# Patient Record
Sex: FEMALE | Race: WHITE | Employment: OTHER | URBAN - METROPOLITAN AREA
[De-identification: names, ages, dates, MRNs, and addresses within clinical notes are randomized per-mention and may not be internally consistent; named-entity substitution may affect disease eponyms.]

---

## 2017-12-13 ENCOUNTER — HOSPITAL ENCOUNTER (OUTPATIENT)
Dept: MAMMOGRAPHY | Age: 72
Discharge: HOME | End: 2017-12-13
Attending: FAMILY MEDICINE
Payer: MEDICARE

## 2017-12-13 DIAGNOSIS — Z12.31 VISIT FOR SCREENING MAMMOGRAM: ICD-10-CM

## 2017-12-13 PROCEDURE — 77063 BREAST TOMOSYNTHESIS BI: CPT

## 2018-12-17 ENCOUNTER — HOSPITAL ENCOUNTER (OUTPATIENT)
Dept: MAMMOGRAPHY | Age: 73
Discharge: HOME | End: 2018-12-17
Attending: FAMILY MEDICINE
Payer: MEDICARE

## 2018-12-17 DIAGNOSIS — Z12.31 VISIT FOR SCREENING MAMMOGRAM: ICD-10-CM

## 2018-12-17 PROCEDURE — 77063 BREAST TOMOSYNTHESIS BI: CPT

## 2019-10-31 ENCOUNTER — HOSPITAL ENCOUNTER (OUTPATIENT)
Dept: PREADMISSION TESTING | Age: 74
Discharge: HOME | End: 2019-10-31
Payer: MEDICARE

## 2019-10-31 LAB
ALBUMIN SERPL-MCNC: 4.1 G/DL (ref 3.4–5)
ALBUMIN/GLOB SERPL: 1.3 {RATIO} (ref 0.8–1.7)
ALP SERPL-CCNC: 84 U/L (ref 45–117)
ALT SERPL-CCNC: 23 U/L (ref 13–56)
ANION GAP SERPL CALC-SCNC: 6 MMOL/L (ref 3–18)
AST SERPL-CCNC: 18 U/L (ref 10–38)
ATRIAL RATE: 117 BPM
BILIRUB SERPL-MCNC: 0.6 MG/DL (ref 0.2–1)
BUN SERPL-MCNC: 15 MG/DL (ref 7–18)
BUN/CREAT SERPL: 13 (ref 12–20)
CALCIUM SERPL-MCNC: 10.4 MG/DL (ref 8.5–10.1)
CALCULATED R AXIS, ECG10: -38 DEGREES
CALCULATED T AXIS, ECG11: 27 DEGREES
CHLORIDE SERPL-SCNC: 107 MMOL/L (ref 100–111)
CO2 SERPL-SCNC: 28 MMOL/L (ref 21–32)
CREAT SERPL-MCNC: 1.12 MG/DL (ref 0.6–1.3)
DIAGNOSIS, 93000: NORMAL
ERYTHROCYTE [DISTWIDTH] IN BLOOD BY AUTOMATED COUNT: 13.1 % (ref 11.6–14.5)
GLOBULIN SER CALC-MCNC: 3.1 G/DL (ref 2–4)
GLUCOSE SERPL-MCNC: 94 MG/DL (ref 74–99)
HCT VFR BLD AUTO: 44.7 % (ref 35–45)
HGB BLD-MCNC: 14.7 G/DL (ref 12–16)
MCH RBC QN AUTO: 30.5 PG (ref 24–34)
MCHC RBC AUTO-ENTMCNC: 32.9 G/DL (ref 31–37)
MCV RBC AUTO: 92.7 FL (ref 74–97)
PLATELET # BLD AUTO: 264 K/UL (ref 135–420)
PMV BLD AUTO: 8.8 FL (ref 9.2–11.8)
POTASSIUM SERPL-SCNC: 4.3 MMOL/L (ref 3.5–5.5)
PROT SERPL-MCNC: 7.2 G/DL (ref 6.4–8.2)
Q-T INTERVAL, ECG07: 402 MS
QRS DURATION, ECG06: 90 MS
QTC CALCULATION (BEZET), ECG08: 458 MS
RBC # BLD AUTO: 4.82 M/UL (ref 4.2–5.3)
SODIUM SERPL-SCNC: 141 MMOL/L (ref 136–145)
VENTRICULAR RATE, ECG03: 78 BPM
WBC # BLD AUTO: 7.4 K/UL (ref 4.6–13.2)

## 2019-10-31 PROCEDURE — 36415 COLL VENOUS BLD VENIPUNCTURE: CPT

## 2019-10-31 PROCEDURE — 93005 ELECTROCARDIOGRAM TRACING: CPT

## 2019-10-31 PROCEDURE — 85027 COMPLETE CBC AUTOMATED: CPT

## 2019-10-31 PROCEDURE — 80053 COMPREHEN METABOLIC PANEL: CPT

## 2019-11-20 ENCOUNTER — HOSPITAL ENCOUNTER (OUTPATIENT)
Dept: LAB | Age: 74
Discharge: HOME | End: 2019-11-20
Payer: MEDICARE

## 2019-11-20 LAB
ANION GAP SERPL CALC-SCNC: 6 MMOL/L (ref 3–18)
BUN SERPL-MCNC: 18 MG/DL (ref 7–18)
BUN/CREAT SERPL: 18 (ref 12–20)
CALCIUM SERPL-MCNC: 10 MG/DL (ref 8.5–10.1)
CHLORIDE SERPL-SCNC: 109 MMOL/L (ref 100–111)
CO2 SERPL-SCNC: 27 MMOL/L (ref 21–32)
CREAT SERPL-MCNC: 1.01 MG/DL (ref 0.6–1.3)
GLUCOSE SERPL-MCNC: 97 MG/DL (ref 74–99)
POTASSIUM SERPL-SCNC: 4.2 MMOL/L (ref 3.5–5.5)
SODIUM SERPL-SCNC: 142 MMOL/L (ref 136–145)

## 2019-11-20 PROCEDURE — 80048 BASIC METABOLIC PNL TOTAL CA: CPT

## 2019-11-20 PROCEDURE — 36415 COLL VENOUS BLD VENIPUNCTURE: CPT

## 2019-12-02 ENCOUNTER — ANESTHESIA EVENT (OUTPATIENT)
Dept: SURGERY | Age: 74
End: 2019-12-02
Payer: MEDICARE

## 2019-12-02 ENCOUNTER — ANESTHESIA (OUTPATIENT)
Dept: SURGERY | Age: 74
End: 2019-12-02
Payer: MEDICARE

## 2019-12-02 ENCOUNTER — HOSPITAL ENCOUNTER (OUTPATIENT)
Age: 74
Setting detail: OUTPATIENT SURGERY
Discharge: HOME | End: 2019-12-02
Attending: ORTHOPAEDIC SURGERY | Admitting: ORTHOPAEDIC SURGERY
Payer: MEDICARE

## 2019-12-02 VITALS
WEIGHT: 207 LBS | HEART RATE: 63 BPM | OXYGEN SATURATION: 98 % | SYSTOLIC BLOOD PRESSURE: 140 MMHG | DIASTOLIC BLOOD PRESSURE: 74 MMHG | RESPIRATION RATE: 16 BRPM | TEMPERATURE: 97.2 F | BODY MASS INDEX: 33.27 KG/M2 | HEIGHT: 66 IN

## 2019-12-02 DIAGNOSIS — M65.331 TRIGGER MIDDLE FINGER OF RIGHT HAND: Primary | Chronic | ICD-10-CM

## 2019-12-02 PROCEDURE — 74011000250 HC RX REV CODE- 250: Performed by: ORTHOPAEDIC SURGERY

## 2019-12-02 PROCEDURE — 74011250636 HC RX REV CODE- 250/636: Performed by: NURSE ANESTHETIST, CERTIFIED REGISTERED

## 2019-12-02 PROCEDURE — 74011250636 HC RX REV CODE- 250/636: Performed by: ORTHOPAEDIC SURGERY

## 2019-12-02 PROCEDURE — 76010000154 HC OR TIME FIRST 0.5 HR: Performed by: ORTHOPAEDIC SURGERY

## 2019-12-02 PROCEDURE — 77030002916 HC SUT ETHLN J&J -A: Performed by: ORTHOPAEDIC SURGERY

## 2019-12-02 PROCEDURE — 77030040361 HC SLV COMPR DVT MDII -B: Performed by: ORTHOPAEDIC SURGERY

## 2019-12-02 PROCEDURE — 77030020782 HC GWN BAIR PAWS FLX 3M -B: Performed by: ORTHOPAEDIC SURGERY

## 2019-12-02 PROCEDURE — 77030018836 HC SOL IRR NACL ICUM -A: Performed by: ORTHOPAEDIC SURGERY

## 2019-12-02 PROCEDURE — 76210000021 HC REC RM PH II 0.5 TO 1 HR: Performed by: ORTHOPAEDIC SURGERY

## 2019-12-02 PROCEDURE — 76060000031 HC ANESTHESIA FIRST 0.5 HR: Performed by: ORTHOPAEDIC SURGERY

## 2019-12-02 PROCEDURE — 77030013189 HC SLV COMPR SCD HUNT -B: Performed by: ORTHOPAEDIC SURGERY

## 2019-12-02 RX ORDER — ONDANSETRON 2 MG/ML
4 INJECTION INTRAMUSCULAR; INTRAVENOUS ONCE
Status: CANCELLED | OUTPATIENT
Start: 2019-12-02 | End: 2019-12-02

## 2019-12-02 RX ORDER — FENTANYL CITRATE 50 UG/ML
INJECTION, SOLUTION INTRAMUSCULAR; INTRAVENOUS AS NEEDED
Status: DISCONTINUED | OUTPATIENT
Start: 2019-12-02 | End: 2019-12-02 | Stop reason: HOSPADM

## 2019-12-02 RX ORDER — CEFAZOLIN SODIUM/WATER 2 G/20 ML
2 SYRINGE (ML) INTRAVENOUS ONCE
Status: DISCONTINUED | OUTPATIENT
Start: 2019-12-02 | End: 2019-12-02 | Stop reason: HOSPADM

## 2019-12-02 RX ORDER — PROPOFOL 10 MG/ML
INJECTION, EMULSION INTRAVENOUS AS NEEDED
Status: DISCONTINUED | OUTPATIENT
Start: 2019-12-02 | End: 2019-12-02 | Stop reason: HOSPADM

## 2019-12-02 RX ORDER — NALOXONE HYDROCHLORIDE 0.4 MG/ML
0.4 INJECTION, SOLUTION INTRAMUSCULAR; INTRAVENOUS; SUBCUTANEOUS AS NEEDED
Status: CANCELLED | OUTPATIENT
Start: 2019-12-02

## 2019-12-02 RX ORDER — FENTANYL CITRATE 50 UG/ML
50 INJECTION, SOLUTION INTRAMUSCULAR; INTRAVENOUS
Status: CANCELLED | OUTPATIENT
Start: 2019-12-02

## 2019-12-02 RX ORDER — SODIUM CHLORIDE, SODIUM LACTATE, POTASSIUM CHLORIDE, CALCIUM CHLORIDE 600; 310; 30; 20 MG/100ML; MG/100ML; MG/100ML; MG/100ML
100 INJECTION, SOLUTION INTRAVENOUS CONTINUOUS
Status: CANCELLED | OUTPATIENT
Start: 2019-12-02

## 2019-12-02 RX ORDER — HYDROCODONE BITARTRATE AND ACETAMINOPHEN 5; 325 MG/1; MG/1
1 TABLET ORAL
Qty: 20 TAB | Refills: 0 | Status: SHIPPED | OUTPATIENT
Start: 2019-12-02 | End: 2019-12-05

## 2019-12-02 RX ORDER — OXYCODONE AND ACETAMINOPHEN 5; 325 MG/1; MG/1
1 TABLET ORAL
Status: CANCELLED | OUTPATIENT
Start: 2019-12-02 | End: 2019-12-03

## 2019-12-02 RX ORDER — MIDAZOLAM HYDROCHLORIDE 1 MG/ML
INJECTION, SOLUTION INTRAMUSCULAR; INTRAVENOUS AS NEEDED
Status: DISCONTINUED | OUTPATIENT
Start: 2019-12-02 | End: 2019-12-02 | Stop reason: HOSPADM

## 2019-12-02 RX ORDER — FLUMAZENIL 0.1 MG/ML
0.2 INJECTION INTRAVENOUS
Status: CANCELLED | OUTPATIENT
Start: 2019-12-02

## 2019-12-02 RX ORDER — SODIUM CHLORIDE, SODIUM LACTATE, POTASSIUM CHLORIDE, CALCIUM CHLORIDE 600; 310; 30; 20 MG/100ML; MG/100ML; MG/100ML; MG/100ML
125 INJECTION, SOLUTION INTRAVENOUS CONTINUOUS
Status: DISCONTINUED | OUTPATIENT
Start: 2019-12-02 | End: 2019-12-02 | Stop reason: HOSPADM

## 2019-12-02 RX ORDER — BUPIVACAINE HYDROCHLORIDE 5 MG/ML
INJECTION, SOLUTION EPIDURAL; INTRACAUDAL AS NEEDED
Status: DISCONTINUED | OUTPATIENT
Start: 2019-12-02 | End: 2019-12-02 | Stop reason: HOSPADM

## 2019-12-02 RX ADMIN — MIDAZOLAM 1 MG: 1 INJECTION INTRAMUSCULAR; INTRAVENOUS at 11:34

## 2019-12-02 RX ADMIN — FENTANYL CITRATE 25 MCG: 50 INJECTION, SOLUTION INTRAMUSCULAR; INTRAVENOUS at 11:35

## 2019-12-02 RX ADMIN — SODIUM CHLORIDE, SODIUM LACTATE, POTASSIUM CHLORIDE, AND CALCIUM CHLORIDE 125 ML/HR: 600; 310; 30; 20 INJECTION, SOLUTION INTRAVENOUS at 09:02

## 2019-12-02 RX ADMIN — PROPOFOL 20 MG: 10 INJECTION, EMULSION INTRAVENOUS at 11:36

## 2019-12-02 RX ADMIN — FENTANYL CITRATE 25 MCG: 50 INJECTION, SOLUTION INTRAMUSCULAR; INTRAVENOUS at 11:38

## 2019-12-02 RX ADMIN — MIDAZOLAM 1 MG: 1 INJECTION INTRAMUSCULAR; INTRAVENOUS at 11:27

## 2019-12-02 NOTE — PERIOP NOTES
Reviewed PTA medication list with patient/caregiver and patient/caregiver denies any additional medications. Patient admits to having a responsible adult care for them for at least 24 hours after surgery.     Dual skin assessment completed by Wily MONTAÑO and Magdalena Chery RN.

## 2019-12-02 NOTE — ANESTHESIA POSTPROCEDURE EVALUATION
Post-Anesthesia Evaluation and Assessment    Cardiovascular Function/Vital Signs  Visit Vitals  /74   Pulse 67   Temp 36.4 °C (97.5 °F)   Resp 16   Ht 5' 6.25\" (1.683 m)   Wt 93.9 kg (207 lb)   SpO2 97%   BMI 33.16 kg/m²       Patient is status post Procedure(s):  RIGHT MIDDLE FINGER TRIGGER RELEASE, \"SPEC POP\". Nausea/Vomiting: Controlled. Postoperative hydration reviewed and adequate. Pain:  Pain Scale 1: Numeric (0 - 10) (12/02/19 1222)  Pain Intensity 1: 0 (12/02/19 1222)   Managed. Neurological Status:   Neuro (WDL): Within Defined Limits (12/02/19 1222)   At baseline. Mental Status and Level of Consciousness: Arousable. Pulmonary Status:   O2 Device: Room air (12/02/19 1202)   Adequate oxygenation and airway patent. Complications related to anesthesia: None    Post-anesthesia assessment completed. No concerns. Patient has met all discharge requirements.     Signed By: Danielle Briceño CRNA    December 2, 2019

## 2019-12-02 NOTE — DISCHARGE INSTRUCTIONS
Ice and elevation   Resume pre hospital diet  Drink plenty of fluids  Take prescription as directed  Ambulate in house  Avoid heavy lifting pushing/pulling with operative hand  Follow up with Dr. Kiana Perez in 1 week          DISCHARGE SUMMARY from Nurse    PATIENT INSTRUCTIONS:    After general anesthesia or intravenous sedation, for 24 hours or while taking prescription Narcotics:  · Limit your activities  · Do not drive and operate hazardous machinery  · Do not make important personal or business decisions  · Do  not drink alcoholic beverages  · If you have not urinated within 8 hours after discharge, please contact your surgeon on call. Report the following to your surgeon:  · Excessive pain, swelling, redness or odor of or around the surgical area  · Temperature over 100.5  · Nausea and vomiting lasting longer than 4 hours or if unable to take medications  · Any signs of decreased circulation or nerve impairment to extremity: change in color, persistent  numbness, tingling, coldness or increase pain  · Any questions    What to do at Home:  Recommended activity: Ambulate in house, No driving while on analgesics and No heavy lifting until advised     If you experience any of the following symptoms fever, chills, uncontrollable pain , active bleeding, drainage or redness, circulation changes, please follow up with Dr. Kiana Perez. *  Please give a list of your current medications to your Primary Care Provider. *  Please update this list whenever your medications are discontinued, doses are      changed, or new medications (including over-the-counter products) are added. *  Please carry medication information at all times in case of emergency situations. These are general instructions for a healthy lifestyle:    No smoking/ No tobacco products/ Avoid exposure to second hand smoke  Surgeon General's Warning:  Quitting smoking now greatly reduces serious risk to your health.     Obesity, smoking, and sedentary lifestyle greatly increases your risk for illness    A healthy diet, regular physical exercise & weight monitoring are important for maintaining a healthy lifestyle    You may be retaining fluid if you have a history of heart failure or if you experience any of the following symptoms:  Weight gain of 3 pounds or more overnight or 5 pounds in a week, increased swelling in our hands or feet or shortness of breath while lying flat in bed. Please call your doctor as soon as you notice any of these symptoms; do not wait until your next office visit. Patient armband removed and shredded    The discharge information has been reviewed with the patient and caregiver. The patient and caregiver verbalized understanding. Discharge medications reviewed with the patient and caregiver and appropriate educational materials and side effects teaching were provided.   ___________________________________________________________________________________________________________________________________

## 2019-12-02 NOTE — ANESTHESIA PREPROCEDURE EVALUATION
Relevant Problems   No relevant active problems       Anesthetic History     PONV   Pertinent negatives: No increased risk of difficult airway, pseudocholinesterase deficiency, malignant hyperthermia and history of awareness of surgery under anesthesia       Review of Systems / Medical History  Patient summary reviewed, nursing notes reviewed and pertinent labs reviewed    Pulmonary  Within defined limits                 Neuro/Psych   Within defined limits           Cardiovascular    Hypertension: well controlled        Dysrhythmias : atrial fibrillation    Pertinent negatives: No past MI, CAD, PAD, angina and CHF  Exercise tolerance: >4 METS     GI/Hepatic/Renal     GERD: well controlled        Pertinent negatives: No hepatitis, liver disease and renal disease   Endo/Other      Hypothyroidism: well controlled  Obesity and arthritis  Pertinent negatives: No diabetes, hyperthyroidism and blood dyscrasia   Other Findings              Physical Exam    Airway  Mallampati: III  TM Distance: 4 - 6 cm  Neck ROM: decreased range of motion   Mouth opening: Normal     Cardiovascular    Rhythm: irregular  Rate: normal         Dental  No notable dental hx       Pulmonary  Breath sounds clear to auscultation               Abdominal  GI exam deferred       Other Findings            Anesthetic Plan    ASA: 3  Anesthesia type: MAC          Induction: Intravenous  Anesthetic plan and risks discussed with: Patient and Spouse

## 2019-12-02 NOTE — PERIOP NOTES
I have reviewed the provider's instructions with the patient, answering all questions to her satisfaction. Reviewed D/C instructions, medications, and AVS. Pt to f/u in 1 week. Pt has appt scheduled. . No duplicate meds. Noted.

## 2019-12-02 NOTE — H&P
History and Physical        Patient: Merissa Love               Sex: female          DOA: 12/2/2019         YOB: 1945      Age:  75 y.o.        LOS:  LOS: 0 days        HPI:     Merissa Love is a 75 y.o. female who has a right middle trigger finger hasn't responded to non-op therapy    Past Medical History:   Diagnosis Date    Atrial fibrillation ([facility] 2017    Cancer ([facility] 03/2009    endometrial    GERD (gastroesophageal reflux disease)     Hx of colonic polyps     Hypertension before 2004    Nausea & vomiting     OA (osteoarthritis)     spine, hands,    Radiation therapy complication     lower body, endomentrial ca    Thyroid disease     hypo    V tach ([facility] [facility] 2006       Past Surgical History:   Procedure Laterality Date    HX CHOLECYSTECTOMY      HX COLONOSCOPY  11-24-15    Dr. Teresa Evans ARTHROSCOPY Right 7/2013    HX ELINA AND BSO  04/2009    lymph nodes       History reviewed. No pertinent family history. Social History     Socioeconomic History    Marital status: SINGLE     Spouse name: Not on file    Number of children: Not on file    Years of education: Not on file    Highest education level: Not on file   Tobacco Use    Smoking status: Never Smoker    Smokeless tobacco: Never Used   Substance and Sexual Activity    Alcohol use: Yes     Alcohol/week: 1.0 standard drinks     Types: 1 Glasses of wine per week    Drug use: No       Prior to Admission medications    Medication Sig Start Date End Date Taking? Authorizing Provider   apixaban (ELIQUIS) 5 mg tablet Take 5 mg by mouth two (2) times a day. Indications: Afib   Yes Provider, Historical   levothyroxine (SYNTHROID) 75 mcg tablet Take 75 mcg by mouth Daily (before breakfast). Indications: a condition with low thyroid hormone levels   Yes Provider, Historical   pantoprazole (PROTONIX) 40 mg tablet Take 40 mg by mouth nightly.  Indications: gastroesophageal reflux disease   Yes Provider, Historical   cyanocobalamin (VITAMIN B12) 100 mcg tablet Take 100 mcg by mouth daily. Yes Provider, Historical   bisoprolol-hydrochlorothiazide (ZIAC) 10-6.25 mg per tablet Take 2 Tabs by mouth daily. Indications: high blood pressure   Yes Provider, Historical   losartan (COZAAR) 50 mg tablet Take 50 mg by mouth nightly. Indications: high blood pressure   Yes Provider, Historical   simvastatin (ZOCOR) 20 mg tablet Take 20 mg by mouth nightly. Indications: high cholesterol   Yes Provider, Historical   calcium citrate-vitamin d3 (CITRACAL + D) 315-200 mg-unit tab Take 1 tablet by mouth two (2) times daily (with meals). Yes Provider, Historical       No Known Allergies    Review of Systems  A comprehensive review of systems was negative except for that written in the History of Present Illness. Physical Exam:      Visit Vitals  /75   Pulse 73   Temp 97.5 °F (36.4 °C)   Resp 16   Ht 5' 6.25\" (1.683 m)   Wt 93.9 kg (207 lb)   SpO2 100%   BMI 33.16 kg/m²       Physical Exam:  Physical Exam:   General:  Alert, cooperative, no distress, appears stated age. Eyes:  Conjunctivae/corneas clear. PERRL, EOMs intact. Fundi benign   Ears:  Normal TMs and external ear canals both ears. Nose: Nares normal. Septum midline. Mucosa normal. No drainage or sinus tenderness. Mouth/Throat: Lips, mucosa, and tongue normal. Teeth and gums normal.   Neck: Supple, symmetrical, trachea midline, no adenopathy, thyroid: no enlargement/tenderness/nodules, no carotid bruit and no JVD. Back:   Symmetric, no curvature. ROM normal. No CVA tenderness. Lungs:   Clear to auscultation bilaterally. Heart:  Regular rate and rhythm, S1, S2 normal, no murmur, click, rub or gallop. Abdomen:   Soft, non-tender. Bowel sounds normal. No masses,  No organomegaly. Extremities: Extremities normal, atraumatic, no cyanosis or edema. Right MF tender A1 pulley   Pulses: 2+ and symmetric all extremities.    Skin: Skin color, texture, turgor normal. No rashes or lesions   Lymph nodes: Cervical, supraclavicular, and axillary nodes normal.   Neurologic: CNII-XII intact. Normal strength, sensation and reflexes throughout. Labs Reviewed: All lab results for the last 24 hours reviewed.     Assessment/Plan     Principal Problem:    Trigger middle finger of right hand (12/2/2019)        Right MF trigger release

## 2019-12-03 NOTE — OP NOTES
[address] REPORT    Name:  Reynaldo Mathew  MR#:   [id number]  :  1945  ACCOUNT #:  [de-identified]  DATE OF SERVICE:  2019    PREOPERATIVE DIAGNOSIS:  Right middle finger trigger finger. POSTOPERATIVE DIAGNOSIS:  Right middle finger trigger finger. PROCEDURE PERFORMED:  Right middle finger trigger finger release. SURGEON:  Pranay Rahman MD    ASSISTANT:  There were no assistants. ANESTHESIOLOGIST:  Dr. Alma Rosa Phillips. ANESTHESIA:  General.    COMPLICATIONS:  No complications. SPECIMENS REMOVED:  No specimen. IMPLANTS:  No implants. ESTIMATED BLOOD LOSS:  No blood loss. INDICATIONS:  A 48-year-old female with symptomatic right middle finger trigger finger. PROCEDURE:  The patient was brought to the operating room after receiving antibiotics. In the OR, the right forearm, wrist, and hand were prepped and draped sterilely. Under light sedation, the area over the middle finger A1 pulley was infiltrated with lidocaine and then a small 1.5-cm incision was made in line with the skin crease. Blunt dissection was carried down to the tendon sheath. The A1 pulley was released under direct visualization. The finger was put through range of motion to ensure normal gliding. At this point, the skin and subcu were closed with interrupted nylon sutures.       Virginie Butler MD      [id number]  D:  2019 11:57  T:  2019 20:41  JOB #:  [id number]

## 2019-12-19 ENCOUNTER — HOSPITAL ENCOUNTER (OUTPATIENT)
Dept: MAMMOGRAPHY | Age: 74
Discharge: HOME | End: 2019-12-19
Payer: MEDICARE

## 2019-12-19 DIAGNOSIS — Z12.31 SCREENING MAMMOGRAM FOR HIGH-RISK PATIENT: ICD-10-CM

## 2019-12-19 PROCEDURE — 77063 BREAST TOMOSYNTHESIS BI: CPT

## 2020-01-21 ENCOUNTER — HOSPITAL ENCOUNTER (OUTPATIENT)
Dept: PHYSICAL THERAPY | Age: 75
Discharge: HOME | End: 2020-01-21
Payer: MEDICARE

## 2020-01-21 PROCEDURE — 97018 PARAFFIN BATH THERAPY: CPT

## 2020-01-21 PROCEDURE — 97110 THERAPEUTIC EXERCISES: CPT

## 2020-01-21 PROCEDURE — 97165 OT EVAL LOW COMPLEX 30 MIN: CPT

## 2020-01-21 NOTE — PROGRESS NOTES
OT DAILY TREATMENT NOTE 10-18    Patient Name: Rene Mortensen  CEFS:  : 1945  [x]  Patient  Verified  Payor: [place] MEDICARE / Plan: [place] MEDICARE PART A & B / Product Type: Medicare /    In time:205  Out time:245  Total Treatment Time (min): 40  Visit #: 1 of 18    Medicare/BCBS Only   Total Timed Codes (min):  23 1:1 Treatment Time:  40     Treatment Area: Hand pain, right [M79.161]    SUBJECTIVE  Pain Level (0-10 scale): 3/10  Any medication changes, allergies to medications, adverse drug reactions, diagnosis change, or new procedure performed?: [x] No    [] Yes (see summary sheet for update)  Subjective functional status/changes:   [] No changes reported  It hurts ot hold steering wheel    OBJECTIVE    Modality rationale: decrease pain and increase tissue extensibility to improve the patients ability to grasp   Min Type Additional Details    [] Estim:  []Unatt       []IFC  []Premod                        []Other:  []w/ice   []w/heat  Position:  Location:    [] Estim: []Att    []TENS instruct  []NMES                    []Other:  []w/US   []w/ice   []w/heat  Position:  Location:    []  Traction: [] Cervical       []Lumbar                       [] Prone          []Supine                       []Intermittent   []Continuous Lbs:  [] before manual  [] after manual    []  Ultrasound: []Continuous   [] Pulsed                           []1MHz   []3MHz W/cm2:  Location:    []  Iontophoresis with dexamethasone         Location: [] Take home patch   [] In clinic    []  Ice     []  heat  []  Ice massage  []  Laser   []  Anodyne Position:  Location:    []  Laser with stim  []  Other: Paraffin 10 mins Position:  Location:right hand    []  Vasopneumatic Device Pressure:       [] lo [] med [] hi   Temperature: [] lo [] med [] hi       [x] Skin assessment post-treatment:  [x]intact []redness- no adverse reaction    []redness  adverse reaction:     17 min [x]Eval                  []Re-Eval       8 min Therapeutic Exercise:  [] See flow sheet :   Rationale: increase ROM to improve the patients ability to grasp  PROm digit flexion extension reviewed  Reviewed diagnosis and causes       5 min Manual Therapy:  Instrument assisted   Rationale: decrease pain and increase ROM to grasp  Instrument assisted soft tissue mobilization to palm right hand    With   [] TE   [] TA   [] neuro   [] other: Patient Education: [x] Review HEP    [] Progressed/Changed HEP based on:   [] positioning   [] body mechanics   [] transfers   [] heat/ice application   [] Splint wear/care   [] Sensory re-education   [] scar management      [] other:            Other Objective/Functional Measures:     Subjective: pt is a right hand dominant, 74 y.o.y/o, female who sustained had 10 year history of triggering * and had surgery on 12/2/20. Prior level of function: I self care, cooking, home care, reading, knitting, driving,   Pain XHEFR:(8-BU pain 10-debilitating pain) mild    Description/Location: right finger with c/o no relief   Worst pain9/10 Least pain 3/10   Activities which aggravate pain: grasp   Activities which ease pain: nothing  Current functional limitations/living situation: With other,     Medical hx: CAncer history, atrial fib, HTN, thyroid, arthritis    Medications: See the written copy of this report in the patient's paper medical record.        Objective:  Edema: Circumference    Right  Left   PIP   6.5cm  5.9cm  Scar/Wound: size, color, drainage, adhesions, location: palm right hand    Sensation:No changes noted    Hand ROM R/L   Index Middle Ring Small Thumb    MP  0-75/80    CMC   PIP  20-90/110    MP   DIP  0-60/80    IP         Johnson Abd         Radial Abd     Hand Strength:   Gross Grasp 3pt Pinch Lateral Pinch Tip Pinch   Right  28 11     Left 52 10       Nine-Hole Peg Test:  Left= __22___seconds  Right=__27___seconds  Finger Opposition:WNL  Palpation: Tender A1 pulley    Pain Level (0-10 scale) post treatment: 11/2    ASSESSMENT/Changes in Function:      [x]  See Plan of Care  []  See progress note/recertification  []  See Discharge Summary             PLAN  []  Upgrade activities as tolerated     []  Continue plan of care  []  Update interventions per flow sheet       []  Discharge due to:_  []  Other:_      Rhiannon Valdivia OTR/L 1/21/2020  1:56 PM    Future Appointments   Date Time Provider Sheela Huddleston   1/21/2020  2:00 PM Anabella Mendoza OTR/L [id number]

## 2020-01-21 NOTE — PROGRESS NOTES
[facility]  [facility]  [address] [place] [address]  [phone] fax    Plan of Care/Statement of Necessity for Occupational Therapy Services    Patient name: Kwabena Hernandes Start of Care: 2020   Referral source: Hellen Staley MD : 1945    Medical Diagnosis: Hand pain, right [M79.641]  Payor: [place] MEDICARE / Plan: VA MEDICARE PART A & B / Product Type: Medicare /  Onset Date:2019    Treatment Diagnosis: Pain right hand   Prior Hospitalization: see medical history Provider#: [id number]   Medications: Verified on Patient summary List    Comorbidities: Right trigger finger release, atrial fib, thyroid, arthritis, endometrial cancer   Prior Level of Function: I self care home care driving, retired from resort, likes reading, knitting          The Plan of Care and following information is based on the information from the initial evaluation. Assessment/ key information: Jill Allison is a right hand dominant, 75 y.o.y/o, female whohad 10 year history of triggering previously resolved with injections * and had surgery on 20. She reports pain of 3/10 today. She is unable to fully flex or extend right middle digit as follows:  MP 0-75 ( left 80), PIP 20-90 ( left 110), DIP 0-60 ( left 80). Edema is present with girth of 6.5 vs 5.3 on left middle digit PIP.  is 28# ( left 52). Her fine motor skills are slowed at 27 seconds for 9 hole peg test.  Her hand in tender in palm and proximal phalanx. Her FOTO is 59/100 reflecting slight impairment in function. She will benefit from skilled occupational therapy to resolve pain, improve ROM and function.       Evaluation Complexity: History LOW Complexity : Brief history review  Examination LOW Complexity : 1-3 performance deficits relating to physical, cognitive , or psychosocial skils that result in activity limitations and / or participation restrictions  Clinical Decision Making LOW Complexity : No comorbidities that affect functional and no verbal or physical assistance needed to complete eval tasks   Overall Complexity Rating: LOW     Problem List: Pain effecting function, Decreased range of motion, Decreased strength, Edema effecting function, Decreased coordination/prehension, Decreased ADL/functional abilities  and Other     Treatment Plan may include any combination of the following: Therapeutic exercise, Therapeutic activities, Physical agent/modality, Scar management, Manual therapy, Patient education and ADLs/IADLs    Patient / Family readiness to learn indicated by: asking questions, trying to perform skills and interest    Persons(s) to be included in education:   patient (P)    Barriers to Learning/Limitations: None    Patient Goal (s): Finger to bend and straighten, be able to grasp    Patient Self Reported Health Status: good    Rehabilitation Potential: excellent    Short Term Goals: To be accomplished in 2 weeks:  1. Patient will be independent in home exercise program for digit ROM. 2.  Patient to be independent in edema management strategies. 3.  Patient will be able to touch palm with middle digit. Long Term Goals: To be accomplished in 4 weeks:   1. Patient will increase middle digit MEEK by at least   30 degrees for better use for home care tasks. 2.  Patient will be able to  with at least 40# to assit with opening jars. 3.  Patient will report pain 0-2/10 with use for home care tasks  4. Patient will report improved ability to perform carrying and grasping with right hand as shown by increase in FOTO of at least 10 points.       Frequency / Duration: Patient to be seen 3 times per week for 4 weeks:    Patient/ Caregiver education and instruction: Diagnosis, prognosis, self care, activity modification and exercises  [x]  Plan of care has been reviewed with GARCIA    Certification Period: 1/21/20-2/19/20    TYREL Mcmahon 1/21/2020 3:44 PM      ________________________________________________________________________    I certify that the above Therapy Services are being furnished while the patient is under my care. I agree with the treatment plan and certify that this therapy is necessary.     Physician's Signature:____________Date:_________TIME:________    ** Signature, Date and Time must be completed for valid certification **    Please sign and return to [facility] [facility]   [address] [place] [address]  [phone] fax

## 2020-01-24 ENCOUNTER — HOSPITAL ENCOUNTER (OUTPATIENT)
Dept: PHYSICAL THERAPY | Age: 75
Discharge: HOME | End: 2020-01-24
Payer: MEDICARE

## 2020-01-24 PROCEDURE — 97110 THERAPEUTIC EXERCISES: CPT

## 2020-01-24 PROCEDURE — 97140 MANUAL THERAPY 1/> REGIONS: CPT

## 2020-01-24 PROCEDURE — 97018 PARAFFIN BATH THERAPY: CPT

## 2020-01-24 NOTE — PROGRESS NOTES
OT DAILY TREATMENT NOTE 10-18    Patient Name: Joe aMllory  AUYO:[id number]  : 1945  [x]  Patient  Verified  Payor: [place] MEDICARE / Plan: [place] MEDICARE PART A & B / Product Type: Medicare /    In time:1030  Out time:1115  Total Treatment Time (min): 45  Visit #: 2 of 18    Medicare/BCBS Only   Total Timed Codes (min):  35 1:1 Treatment Time:  45     Treatment Area: Hand pain, right [M79.641]    SUBJECTIVE  Pain Level (0-10 scale): 2/10  Any medication changes, allergies to medications, adverse drug reactions, diagnosis change, or new procedure performed?: [x] No    [] Yes (see summary sheet for update)  Subjective functional status/changes:   [] No changes reported  Feeling better  Sleeve helping    OBJECTIVE    Modality rationale: decrease inflammation, decrease pain and increase tissue extensibility to improve the patients ability to grasp   Min Type Additional Details    [] Estim:  []Unatt       []IFC  []Premod                        []Other:  []w/ice   []w/heat  Position:  Location:    [] Estim: []Att    []TENS instruct  []NMES                    []Other:  []w/US   []w/ice   []w/heat  Position:  Location:    []  Traction: [] Cervical       []Lumbar                       [] Prone          []Supine                       []Intermittent   []Continuous Lbs:  [] before manual  [] after manual   8 [x]  Ultrasound: []Continuous   [x] Pulsed              50%             []1MHz   [x]3MHz W/cm2:1.0  Location:palm    []  Iontophoresis with dexamethasone         Location: [] Take home patch   [] In clinic    []  Ice     []  heat  []  Ice massage  []  Laser   []  Anodyne Position:  Location:    []  Laser with stim  []  Other:  Position:  Location:    []  Vasopneumatic Device Pressure:       [] lo [] med [] hi   Temperature: [] lo [] med [] hi       [x] Skin assessment post-treatment:  [x]intact []redness- no adverse reaction      19 min Therapeutic Exercise:  [] See flow sheet :   Rationale: increase ROM to improve the patients ability to improve digit ROM  Sot putty and pegs flattening right hand x 10  Hook fist right hand x 10      8 min Manual Therapy:  IASTM   Rationale: increase ROM to improve grasp  IASTM to palm to reduce edema and adhesions and improve digit ROM      With   [] TE   [] TA   [] neuro   [] other: Patient Education: [x] Review HEP    [] Progressed/Changed HEP based on: edema massage  [] positioning   [] body mechanics   [] transfers   [] heat/ice application   [] Splint wear/care   [] Sensory re-education   [] scar management      [] other:            Other Objective/Functional Measures:   Reduced redness in palm today  Digit becoming straighter at PIP     Pain Level (0-10 scale) post treatment: 2/10    ASSESSMENT/Changes in Function: Improving ROM, pain    Patient will continue to benefit from skilled OT services to modify and progress therapeutic interventions, address ROM deficits, address strength deficits, analyze and address soft tissue restrictions and instruct in home and community integration to attain remaining goals. []  See Plan of Care  []  See progress note/recertification  []  See Discharge Summary         Progress towards goals / Updated goals:  1. Patient will be independent in home exercise program for digit ROM. 2.  Patient to be independent in edema management strategies. Status at last visit: has edema sleeve 1/21/20, introduced edema massage 1/24/20  3. Patient will be able to touch palm with middle digit.     Long Term Goals: To be accomplished in 4 weeks:              1.  Patient will increase middle digit MEEK by at least   30 degrees for better use for home care tasks. Status last visit: Improving extension of PIP 1/24/20  2. Patient will be able to  with at least 40# to assit with opening jars. 3.  Patient will report pain 0-2/10 with use for home care tasks  Status last visit; Met for today 1/24/20, look for consitency  4.   Patient will report improved ability to perform carrying and grasping with right hand as shown by increase in FOTO of at least 10 points*    PLAN  []  Upgrade activities as tolerated     [x]  Continue plan of care  []  Update interventions per flow sheet       []  Discharge due to:_  []  Other:_      TYREL Chaidez 1/24/2020  10:39 AM    Future Appointments   Date Time Provider Sheela Huddleston   1/27/2020  4:45 PM Boykinhelga Perez LYFNVPP [facility]   1/29/2020  2:30 PM Ashutosh Perez VADROQX [facility]   2/3/2020  3:15 PM Boykinhelga Perez VAKPNHF [facility]   2/5/2020 11:00 AM Ashutosh Perez ZMKROEE [facility]   2/10/2020  1:45 PM Boykinhelga Perez GOGYVCW [facility]   2/12/2020 10:15 AM Boykinhelga Perze EPNXEVI [facility]   2/14/2020 10:30 AM Boykin Ana SRJKQGL [facility]

## 2020-01-27 ENCOUNTER — HOSPITAL ENCOUNTER (OUTPATIENT)
Dept: PHYSICAL THERAPY | Age: 75
Discharge: HOME | End: 2020-01-27
Payer: MEDICARE

## 2020-01-27 PROCEDURE — 97035 APP MDLTY 1+ULTRASOUND EA 15: CPT

## 2020-01-27 PROCEDURE — 97110 THERAPEUTIC EXERCISES: CPT

## 2020-01-27 PROCEDURE — 97018 PARAFFIN BATH THERAPY: CPT

## 2020-01-27 NOTE — PROGRESS NOTES
OT DAILY TREATMENT NOTE 10-18    Patient Name: Kwabena Hernandes  MXCK:  : 1945  [x]  Patient  Verified  Payor: [place] MEDICARE / Plan: [place] MEDICARE PART A & B / Product Type: Medicare /    In time:138  Out time:221  Total Treatment Time (min): 43  Visit #: 3 of 8    Medicare/BCBS Only   Total Timed Codes (min):  33 1:1 Treatment Time:  43     Treatment Area: Hand pain, right [M79.641]    SUBJECTIVE  Pain Level (0-10 scale): 1-2/10  Any medication changes, allergies to medications, adverse drug reactions, diagnosis change, or new procedure performed?: [x] No    [] Yes (see summary sheet for update)  Subjective functional status/changes:   [] No changes reported  It is very hard to find paraffin wax. OBJECTIVE            Modality rationale: decrease inflammation, decrease pain and increase tissue extensibility to improve the patients ability to grasp   Min Type Additional Details      []? Estim:  []? Unatt       []? IFC  []? Premod                        []?Other:  []?w/ice   []?w/heat  Position:  Location:      []? Estim: []? Att    []? TENS instruct  []? NMES                    []?Other:  []?w/US   []?w/ice   []?w/heat  Position:  Location:      []? Traction: []? Cervical       []? Lumbar                       []? Prone          []? Supine                       []?Intermittent   []? Continuous Lbs:  []? before manual  []? after manual    8 [x]? Ultrasound: []? Continuous   [x]? Pulsed              50%             []? 1MHz   [x]? 3MHz W/cm2:1.0  Location:palm      []? Iontophoresis with dexamethasone         Location: []? Take home patch   []? In clinic      []? Ice     []?  heat  []? Ice massage  []? Laser   []? Anodyne Position:  Location:    10  []? Laser with stim  [x]? Other: Paraffin Position:  Location: Right Hand      []? Vasopneumatic Device Pressure:       []? lo []? med []? hi   Temperature: []? lo []? med []? hi       [x]? Skin assessment post-treatment:  [x]? intact []? redness- no adverse reaction    17 min Therapeutic Exercise:  [] See flow sheet :   Rationale: increase ROM and increase strength to improve the patients ability to improve digit ROM    Hook fist 10x  MF Extension  MF Abduction/Adduction   Soft Putty and pegs- Flattening Right hand 10/10     8 min Manual Therapy:  IASTM   Rationale: increase ROM and increase tissue extensibility to improve grasp    IASTM to palm to reduce edema and adhesion to improve digit ROM      With   [] TE   [] TA   [] neuro   [] other: Patient Education: [x] Review HEP    [] Progressed/Changed HEP based on:   [] positioning   [] body mechanics   [] transfers   [] heat/ice application   [] Splint wear/care   [] Sensory re-education   [] scar management      [] other:            Other Objective/Functional Measures:   Tightness with Abduction/Adduction reported        Pain Level (0-10 scale) post treatment: 1-2/10    ASSESSMENT/Changes in Function: Digit ROM improving     Patient will continue to benefit from skilled OT services to modify and progress therapeutic interventions, address ROM deficits, address strength deficits and instruct in home and community integration to attain remaining goals. []  See Plan of Care  []  See progress note/recertification  []  See Discharge Summary         Progress towards goals / Updated goals:  1.  Patient will be independent in home exercise program for digit ROM. Progressing 1/27/20  2.  Patient to be independent in edema management strategies. Status at last visit: has edema sleeve 1/21/20, introduced edema massage 1/24/20  3.  Patient will be able to touch palm with middle digit.     Long Term Goals: To be accomplished in 4 weeks:              1.  Patient will increase middle digit MEEK by at least   30 degrees for better use for home care tasks. Status last visit: Improving extension of PIP 1/24/20  2.  Patient will be able to  with at least 40# to assit with opening jars.   3.  Patient will report pain 0-2/10 with use for home care tasks  Status last visit;  Met for today 1/24/20, look for consitency  4.  Patient will report improved ability to perform carrying and grasping with right hand as shown by increase in FOTO of at least 10 points         PLAN  []  Upgrade activities as tolerated     [x]  Continue plan of care  []  Update interventions per flow sheet       []  Discharge due to:_  []  Other:_      JOSE Rhoades/L 1/27/2020  1:33 PM     Future Appointments   Date Time Provider Sheela Huddleston   1/27/2020  1:45 PM Marissa Spurling [id number] [address]   1/29/2020  2:30 PM Marissa Spurling ZBECROO [address]   2/3/2020  3:15 PM Marissa Spurling QXQKESR [address]   2/5/2020 11:00 AM Marissa Spurling NDIEHRI [address]   2/10/2020  1:45 PM Marissa Spurling SMCKBQN [address]   2/12/2020 10:15 AM Marissa Spurling BOVYRHG [address]   2/14/2020 10:30 AM Marissa Spurling [id number] [address]

## 2020-01-29 ENCOUNTER — APPOINTMENT (OUTPATIENT)
Dept: PHYSICAL THERAPY | Age: 75
End: 2020-01-29
Payer: MEDICARE

## 2020-01-30 ENCOUNTER — HOSPITAL ENCOUNTER (OUTPATIENT)
Dept: PHYSICAL THERAPY | Age: 75
Discharge: HOME | End: 2020-01-30
Payer: MEDICARE

## 2020-01-30 PROCEDURE — 97035 APP MDLTY 1+ULTRASOUND EA 15: CPT

## 2020-01-30 PROCEDURE — 97018 PARAFFIN BATH THERAPY: CPT

## 2020-01-30 PROCEDURE — 97110 THERAPEUTIC EXERCISES: CPT

## 2020-01-30 NOTE — PROGRESS NOTES
OT DAILY TREATMENT NOTE 10-18    Patient Name: Ghada Torres  AZUX:  : 1945  [x]  Patient  Verified  Payor: [place] MEDICARE / Plan: [place] MEDICARE PART A & B / Product Type: Medicare /    In time:412  Out time:455  Total Treatment Time (min): 43  Visit #: 4 of 8    Medicare/BCBS Only   Total Timed Codes (min):  33 1:1 Treatment Time:  43     Treatment Area: Hand pain, right [M99.301]    SUBJECTIVE  Pain Level (0-10 scale): 1.5/10  Any medication changes, allergies to medications, adverse drug reactions, diagnosis change, or new procedure performed?: [x] No    [] Yes (see summary sheet for update)  Subjective functional status/changes:   [] No changes reported  I woke up this morning and it was almost straight. OBJECTIVE            Modality rationale: decrease inflammation, decrease pain and increase tissue extensibility to improve the patients ability to grasp   Min Type Additional Details       []? ? Estim:  []?? Unatt       []? ?IFC  []? ?Premod                        []? ? Other:  []??w/ice   []? ?w/heat  Position:  Location:       []? ? Estim: []??Att    []? ?TENS instruct  []? ?NMES                    []? ? Other:  []??w/US   []? ?w/ice   []? ?w/heat  Position:  Location:       []? ?  Traction: []?? Cervical       []? ?Lumbar                       []? ? Prone          []? ?Supine                       []? ?Intermittent   []? ? Continuous Lbs:  []?? before manual  []? ? after manual     8 [x]? ?  Ultrasound: []??Continuous   [x]? ? Pulsed              50%             []? ?1MHz   [x]? ? 3MHz W/cm2:1.0  Location:palm       []? ?  Iontophoresis with dexamethasone         Location: []?? Take home patch   []? ? In clinic       []? ?  Ice     []? ?  heat  []? ?  Ice massage  []? ?  Laser   []? ?  Anodyne Position:  Location:     10  []??  Laser with stim  [x]? ?  Other: Paraffin Position:  Location: Right Hand       []? ?  Vasopneumatic Device Pressure:       []? ? lo []? ? med []?? hi   Temperature: []?? lo []? ? med []?? hi      [x]?? Skin assessment post-treatment:  [x]? ? intact []? ?redness- no adverse reaction    25 min Therapeutic Exercise:  [] See flow sheet :   Rationale: increase ROM and increase strength to improve the patients ability to     MP Hyperextension  MF Abduction/Adduction  Soft Putty: 10/10  Hook Fist into soft putty 8x  Towel Scrunch with MF 10x      With   [] TE   [] TA   [] neuro   [] other: Patient Education: [x] Review HEP    [] Progressed/Changed HEP based on:   [] positioning   [] body mechanics   [] transfers   [] heat/ice application   [] Splint wear/care   [] Sensory re-education   [] scar management      [] other:            Other Objective/Functional Measures:     Pain with hook fist on this date      Pain Level (0-10 scale) post treatment: 1/10    ASSESSMENT/Changes in Function: ROM improving    Patient will continue to benefit from skilled OT services to modify and progress therapeutic interventions, address ROM deficits, address strength deficits and instruct in home and community integration to attain remaining goals. []  See Plan of Care  []  See progress note/recertification  []  See Discharge Summary         Progress towards goals / Updated goals:  1.  Patient will be independent in home exercise program for digit ROM. Progressing 1/27/20    2.  Patient to be independent in edema management strategies. Status at last visit: has edema sleeve 1/21/20, introduced edema massage 1/24/20    3.  Patient will be able to touch palm with middle digit. Progressing 1/30/20     Long Term Goals: To be accomplished in 4 weeks:              1.  Patient will increase middle digit MEEK by at least   30 degrees for better use for home care tasks. Status last visit: Improving extension of PIP 1/24/20     2.  Patient will be able to  with at least 40# to assit with opening jars.   Status at last visit: Progressing with in clinic tasks 1/30/20    3.  Patient will report pain 0-2/10 with use for home care tasks  Status last visit;  Met for today 1/24/20, look for consitency    4.  Patient will report improved ability to perform carrying and grasping with right hand as shown by increase in FOTO of at least 10 points       PLAN  []  Upgrade activities as tolerated     [x]  Continue plan of care  []  Update interventions per flow sheet       []  Discharge due to:_  []  Other:_      Hedda Latosha ,GARCIA/L 1/30/2020  1:20 PM    Future Appointments   Date Time Provider Sheela Huddleston   1/30/2020  4:15 PM James Late QFUAFAT [facility]   2/3/2020  3:15 PM [place] Late EMIMWGD [facility]   2/5/2020 11:00 AM James Late EISBIWW [facility]   2/10/2020  1:45 PM [place] Late IFAALLO [facility]   2/12/2020 10:15 AM James Late RFHJVLY [facility]   2/14/2020 10:30 AM [place] Late MMCPTPB [facility]

## 2020-02-03 ENCOUNTER — HOSPITAL ENCOUNTER (OUTPATIENT)
Dept: PHYSICAL THERAPY | Age: 75
Discharge: HOME | End: 2020-02-03
Payer: MEDICARE

## 2020-02-03 PROCEDURE — 97018 PARAFFIN BATH THERAPY: CPT

## 2020-02-03 PROCEDURE — 97110 THERAPEUTIC EXERCISES: CPT

## 2020-02-03 PROCEDURE — 97035 APP MDLTY 1+ULTRASOUND EA 15: CPT

## 2020-02-03 NOTE — PROGRESS NOTES
OT DAILY TREATMENT NOTE 10-18    Patient Name: Gumaro Barnes  HBEA:[id number]  : 1945  [x]  Patient  Verified  Payor: [place] MEDICARE / Plan: [place] MEDICARE PART A & B / Product Type: Medicare /    In time:1055  Out time:1135  Total Treatment Time (min): 40  Visit #: 5 of 8    Medicare/BCBS Only   Total Timed Codes (min):  30 1:1 Treatment Time:  40     Treatment Area: Hand pain, right [M29.611]    SUBJECTIVE  Pain Level (0-10 scale): 2/10  Any medication changes, allergies to medications, adverse drug reactions, diagnosis change, or new procedure performed?: [x] No    [] Yes (see summary sheet for update)  Subjective functional status/changes:   [] No changes reported  It is going to be nice out this afternoon, I'm tempted to do some yard work     OBJECTIVE              Modality rationale: decrease inflammation, decrease pain and increase tissue extensibility to improve the patients ability to grasp   Min Type Additional Details       []??? Estim:  []? ??Unatt       []? ??IFC  []? ? ?Premod                        []? ??Other:  []???w/ice   []? ??w/heat  Position:  Location:       []??? Estim: []? ? ? Att    []? ??TENS instruct  []? ??NMES                    []? ??Other:  []???w/US   []? ??w/ice   []? ??w/heat  Position:  Location:       []? ??  Traction: []??? Cervical       []? ? ?Lumbar                       []? ?? Prone          []? ? ?Supine                       []? ? ? Intermittent   []? ?? Continuous Lbs:  []??? before manual  []? ?? after manual     8 [x]? ??  Ultrasound: []? ? ? Continuous   [x]? ?? Pulsed              50%             []? ??1MHz   [x]? ??3MHz W/cm2:1.0  Location:palm       []? ??  Iontophoresis with dexamethasone         Location: []??? Take home patch   []??? In clinic       []? ??  Ice     []? ??  heat  []? ??  Ice massage  []? ??  Laser   []? ??  Anodyne Position:  Location:     10  []???  Laser with stim  [x]???  Other: Paraffin Position:  Location: Right Hand       []? ??  Vasopneumatic Device Pressure:       []? ?? lo []??? med []? ?? hi   Temperature: []??? lo []? ?? med []? ?? hi        [x]? ?? Skin assessment post-treatment:  [x]? ??intact []? ??redness- no adverse reaction    22 min Therapeutic Exercise:  [] See flow sheet :   Rationale: increase ROM and increase strength to improve the patients ability to     Soft Putty: Manipulated with Right hand to reveal/remove 1/4 in pegs 10/10  MP Hyperextension  MF Abduction/Adduction  Hook Fist into soft putty  Towel Scrunch       With   [] TE   [] TA   [] neuro   [] other: Patient Education: [x] Review HEP    [] Progressed/Changed HEP based on:   [] positioning   [] body mechanics   [] transfers   [] heat/ice application   [] Splint wear/care   [] Sensory re-education   [] scar management      [] other:            Other Objective/Functional Measures:     Some discomfort with hook fist  Re administered new edema sleeve (XL)       Pain Level (0-10 scale) post treatment: 2.5/10    ASSESSMENT/Changes in Function: ROM improving     Patient will continue to benefit from skilled OT services to modify and progress therapeutic interventions, address ROM deficits, address strength deficits and instruct in home and community integration to attain remaining goals. []  See Plan of Care  []  See progress note/recertification   []  See Discharge Summary         Progress towards goals / Updated goals:  1.  Patient will be independent in home exercise program for digit ROM. Met 2/3/20     2.  Patient to be independent in edema management strategies. Status at last visit: has edema sleeve 1/21/20, introduced edema massage 1/24/20     3.  Patient will be able to touch palm with middle digit. Progressing 1/30/20     Long Term Goals: To be accomplished in 4 weeks:              1.  Patient will increase middle digit MEEK by at least   30 degrees for better use for home care tasks.   Status last visit: Improving extension of PIP 1/24/20      2.  Patient will be able to  with at least 40# to assit with opening jars. Status at last visit: Progressing with in clinic tasks 1/30/20     3.  Patient will report pain 0-2/10 with use for home care tasks  Status last visit;  Met for today 1/24/20, look for consitency     4.  Patient will report improved ability to perform carrying and grasping with right hand as shown by increase in FOTO of at least 10 points    PLAN  []  Upgrade activities as tolerated     [x]  Continue plan of care  []  Update interventions per flow sheet       []  Discharge due to:_  []  Other:_      HARJEET Nayak 2/3/2020  10:29 AM    Future Appointments   Date Time Provider Sheela Huddleston   2/5/2020 11:00 AM Mary BARTON [facility]   2/10/2020  1:45 PM Mary GOMEZ [facility]   2/12/2020 10:15 AM Mary SANCHEZVFVBE [facility]   2/14/2020 10:30 AM Mary Rae UMUSRBELENA [facility]

## 2020-02-05 ENCOUNTER — HOSPITAL ENCOUNTER (OUTPATIENT)
Dept: PHYSICAL THERAPY | Age: 75
Discharge: HOME | End: 2020-02-05
Payer: MEDICARE

## 2020-02-05 PROCEDURE — 97035 APP MDLTY 1+ULTRASOUND EA 15: CPT

## 2020-02-05 PROCEDURE — 97018 PARAFFIN BATH THERAPY: CPT

## 2020-02-05 PROCEDURE — 97110 THERAPEUTIC EXERCISES: CPT

## 2020-02-05 NOTE — PROGRESS NOTES
OT DAILY TREATMENT NOTE 10-18    Patient Name: Kaden Angel  [id number]  : 1945  [x]  Patient  Verified  Payor: [place] MEDICARE / Plan: [place] MEDICARE PART A & B / Product Type: Medicare /    In time:1100  Out time:1141  Total Treatment Time (min): 41  Visit #: 6 of 8    Medicare/BCBS Only   Total Timed Codes (min):  31 1:1 Treatment Time:  41     Treatment Area: Hand pain, right [F62.043]    SUBJECTIVE  Pain Level (0-10 scale): 1/10  Any medication changes, allergies to medications, adverse drug reactions, diagnosis change, or new procedure performed?: [x] No    [] Yes (see summary sheet for update)  Subjective functional status/changes:   [] No changes reported  I woke up this morning and it was almost straight. It didn't start to hurt either when I started doing stuff with it. OBJECTIVE                Modality rationale: decrease inflammation, decrease pain and increase tissue extensibility to improve the patients ability to grasp   Min Type Additional Details       []???? Estim:  []????Unatt       []????IFC  []????Premod                        []?? ??Other:  []????w/ice   []? ???w/heat  Position:  Location:       []???? Estim: []?? ?? Att    []????TENS instruct  []????NMES                    []?? ??Other:  []????w/US   []? ???w/ice   []? ???w/heat  Position:  Location:       []????  Traction: []???? Cervical       []????Lumbar                       []???? Prone          []????Supine                       []?? ?? Intermittent   []???? Continuous Lbs:  []???? before manual  []???? after manual     8 [x]? ???  Ultrasound: []???? Continuous   [x]? ??? Pulsed              50%             []????1MHz   [x]????3MHz W/cm2:1.0  Location:palm       []????  Iontophoresis with dexamethasone         Location: []???? Take home patch   []???? In clinic       []????  Ice     []????  heat  []????  Ice massage  []????  Laser   []????  Anodyne Position:  Location:     10  []????  Laser with stim  [x]????  Other: Paraffin Position:  Location: Right Hand       []????  Vasopneumatic Device Pressure:       []???? lo []???? med []???? hi   Temperature: []???? lo []???? med []???? hi        [x]? ??? Skin assessment post-treatment:  [x]? ???intact []? ???redness- no adverse reaction    13 min Therapeutic Exercise:  [] See flow sheet :   Rationale: increase ROM and increase strength to improve the patients ability to , grasp, pinch     MF Hyperextension with hold  MF Abduction/Adduction  Med Theraputty: 10/10    10 min Manual Therapy:  IASTM   Rationale: decrease pain, increase ROM and increase tissue extensibility to improve overall function of Right hand    IASTM to incision site       With   [] TE   [] TA   [] neuro   [] other: Patient Education: [x] Review HEP    [] Progressed/Changed HEP based on:   [] positioning   [] body mechanics   [] transfers   [] heat/ice application   [] Splint wear/care   [] Sensory re-education   [] scar management      [] other:            Other Objective/Functional Measures:     Able to tolerate upgrade in med putty with minimal discomfort     Pain Level (0-10 scale) post treatment: 2/10    ASSESSMENT/Changes in Function: ROM/Strength improving     Patient will continue to benefit from skilled OT services to modify and progress therapeutic interventions, address ROM deficits, address strength deficits and instruct in home and community integration to attain remaining goals. []  See Plan of Care  []  See progress note/recertification  []  See Discharge Summary         Progress towards goals / Updated goals:  1.  Patient will be independent in home exercise program for digit ROM. Met 2/3/20     2.  Patient to be independent in edema management strategies. Status at last visit: has edema sleeve 1/21/20, introduced edema massage 1/24/20     3.  Patient will be able to touch palm with middle digit.  Progressing 1/30/20     Long Term Goals: To be accomplished in 4 weeks:              1.  Patient will increase middle digit MEEK by at least   30 degrees for better use for home care tasks. Status last visit: Improving extension of PIP 1/24/20      2.  Patient will be able to  with at least 40# to assit with opening jars. Status at last visit: Progressing with in clinic tasks 1/30/20     3.  Patient will report pain 0-2/10 with use for home care tasks  Status last visit;  Met for today 1/24/20, look for consitency     4.  Patient will report improved ability to perform carrying and grasping with right hand as shown by increase in FOTO of at least 10 points    PLAN  []  Upgrade activities as tolerated     [x]  Continue plan of care  []  Update interventions per flow sheet       []  Discharge due to:_  []  Other:_      HARJEET Villeda 2/5/2020  11:07 AM    Future Appointments   Date Time Provider Sheela Huddleston   2/10/2020  1:45 PM Kannan Verdin [id number] [facility]   2/12/2020 10:15 AM Kannan Verdin [id number] [facility]   2/14/2020 10:30 AM Kannan Verdin [id number] [facility]

## 2020-02-10 ENCOUNTER — HOSPITAL ENCOUNTER (OUTPATIENT)
Dept: PHYSICAL THERAPY | Age: 75
Discharge: HOME | End: 2020-02-10
Payer: MEDICARE

## 2020-02-10 PROCEDURE — 97110 THERAPEUTIC EXERCISES: CPT

## 2020-02-10 PROCEDURE — 97140 MANUAL THERAPY 1/> REGIONS: CPT

## 2020-02-10 PROCEDURE — 97035 APP MDLTY 1+ULTRASOUND EA 15: CPT

## 2020-02-10 NOTE — PROGRESS NOTES
OT DAILY TREATMENT NOTE 10-18    Patient Name: Olga Puga  RRLK:[id number]  : 1945  [x]  Patient  Verified  Payor: [place] MEDICARE / Plan: [place] MEDICARE PART A & B / Product Type: Medicare /    In time:144  Out time:230  Total Treatment Time (min): 46  Visit #: 7 of 8    Medicare/BCBS Only   Total Timed Codes (min):  36 1:1 Treatment Time:  46     Treatment Area: Hand pain, right [J85.440]    SUBJECTIVE  Pain Level (0-10 scale): 0.5-1/10  Any medication changes, allergies to medications, adverse drug reactions, diagnosis change, or new procedure performed?: [x] No    [] Yes (see summary sheet for update)  Subjective functional status/changes:   [] No changes reported  My hand is doing good. OBJECTIVE  Modality rationale: decrease inflammation, decrease pain and increase tissue extensibility to improve the patients ability to grasp   Min Type Additional Details       []????? Estim:  []??? ?? Unatt       []?????IFC  []??? ? ? Premod                        []??? ?? Other:  []?????w/ice   []?????w/heat  Position:  Location:       []????? Estim: []??? ? ? Att    []?????TENS instruct  []??? ??NMES                    []??? ?? Other:  []?????w/US   []?????w/ice   []?????w/heat  Position:  Location:       []?????  Traction: []????? Cervical       []??? ? ?Lumbar                       []????? Prone          []??? ? ? Supine                       []??? ? ? Intermittent   []??? ? ? Continuous Lbs:  []????? before manual  []????? after manual     8 [x]?????  Ultrasound: []??? ? ? Continuous   [x]????? Pulsed              50%             []?????1MHz   [x]?????3MHz W/cm2:1.0  Location:palm       []?????  Iontophoresis with dexamethasone         Location: []????? Take home patch   []????? In clinic       []?????  Ice     []?????  heat  []?????  Ice massage  []?????  Laser   []?????  Anodyne Position:  Location:     10  []?????  Laser with stim  [x]?????  Other: Paraffin Position:  Location: Right Hand       []?????  Vasopneumatic Device Pressure:       []????? lo []????? med []????? hi   Temperature: []????? lo []????? med []????? hi        [x]????? Skin assessment post-treatment:  [x]? ????intact []?????redness- no adverse reaction     18 min Therapeutic Exercise:  [] See flow sheet :   Rationale: increase ROM and increase strength to improve the patients ability to , grasp, pinch     MF Hyperextension with hold  MF Abduction/Adduction  Med Theraputty: 10/10      10 min Manual Therapy:  IASTM   Rationale: decrease pain, increase ROM and increase tissue extensibility to improve overall function of Right hand    IASTM to palm/incision site     With   [] TE   [] TA   [] neuro   [] other: Patient Education: [x] Review HEP    [] Progressed/Changed HEP based on:   [] positioning   [] body mechanics   [] transfers   [] heat/ice application   [] Splint wear/care   [] Sensory re-education   [] scar management      [] other:            Other Objective/Functional Measures:     Some tenderness on palmar side of MF     Pain Level (0-10 scale) post treatment: 1/10    ASSESSMENT/Changes in Function: ROM improving     Patient will continue to benefit from skilled OT services to modify and progress therapeutic interventions, address ROM deficits, address strength deficits and instruct in home and community integration to attain remaining goals. []  See Plan of Care  []  See progress note/recertification  []  See Discharge Summary         Progress towards goals / Updated goals:  1.  Patient will be independent in home exercise program for digit ROM. Met 2/3/20     2.  Patient to be independent in edema management strategies. Status at last visit: has edema sleeve 1/21/20, introduced edema massage 1/24/20     3.  Patient will be able to touch palm with middle digit. Progressing 2/10/20     Long Term Goals: To be accomplished in 4 weeks:              1.  Patient will increase middle digit MEEK by at least   30 degrees for better use for home care tasks.   Status last visit: Improving extension of PIP 1/24/20      2.  Patient will be able to  with at least 40# to assit with opening jars. Status at last visit: Progressing with in clinic tasks 2/10/20     3.  Patient will report pain 0-2/10 with use for home care tasks  Status last visit;  Met for today 1/24/20, look for consistency 2/10/20     4.  Patient will report improved ability to perform carrying and grasping with right hand as shown by increase in FOTO of at least 10 points    PLAN  []  Upgrade activities as tolerated     [x]  Continue plan of care  []  Update interventions per flow sheet       []  Discharge due to:_  []  Other:_      JOSE Nazario/L 2/10/2020  10:05 AM    Future Appointments   Date Time Provider Sheela Huddleston   2/10/2020  1:45 PM James Late OPFAHEO [facility]   2/12/2020 10:15 AM James Late [id number] [facility]   2/14/2020 10:30 AM James Late [id number] [facility]

## 2020-02-12 ENCOUNTER — HOSPITAL ENCOUNTER (OUTPATIENT)
Dept: PHYSICAL THERAPY | Age: 75
Discharge: HOME | End: 2020-02-12
Payer: MEDICARE

## 2020-02-12 PROCEDURE — 97018 PARAFFIN BATH THERAPY: CPT

## 2020-02-12 PROCEDURE — 97110 THERAPEUTIC EXERCISES: CPT

## 2020-02-12 NOTE — PROGRESS NOTES
OT DAILY TREATMENT NOTE 10-18    Patient Name: Rene Mortensen  TPAK:  : 1945  [x]  Patient  Verified  Payor: [place] MEDICARE / Plan: [place] MEDICARE PART A & B / Product Type: Medicare /    In time:1015  Out time:1054  Total Treatment Time (min): 39  Visit #: 8 of 8    Medicare/BCBS Only   Total Timed Codes (min):  29 1:1 Treatment Time:  39     Treatment Area: Hand pain, right [M16.081]    SUBJECTIVE  Pain Level (0-10 scale): 0/10  Any medication changes, allergies to medications, adverse drug reactions, diagnosis change, or new procedure performed?: [x] No    [] Yes (see summary sheet for update)  Subjective functional status/changes:   [] No changes reported  I want It to be straight again    OBJECTIVE  Modality rationale: decrease inflammation, decrease pain and increase tissue extensibility to improve the patients ability to grasp   Min Type Additional Details       []?????? Estim:  []???? ?? Unatt       []??????IFC  []?????? Premod                        []?????? Other:  []??????w/ice   []??????w/heat  Position:  Location:       []?????? Estim: []???? ? ? Att    []??????TENS instruct  []??????NMES                    []?????? Other:  []??????w/US   []??????w/ice   []??????w/heat  Position:  Location:       []??????  Traction: []?????? Cervical       []??????Lumbar                       []?????? Prone          []?????? Supine                       []???? ? ? Intermittent   []?????? Continuous Lbs:  []?????? before manual  []?????? after manual      []??????  Ultrasound: []?????? Continuous   []?????? Pulsed                        []??????1MHz   [x]??????3MHz W/cm2:  Location:       []??????  Iontophoresis with dexamethasone         Location: []?????? Take home patch   []?????? In clinic       []??????  Ice     []??????  heat  []??????  Ice massage  []??????  Laser   []??????  Anodyne Position:  Location:     10  []??????  Laser with stim  [x]??????  Other: Paraffin Position:  Location: Right Hand       []??????  Vasopneumatic Device Pressure:       []?????? lo []?????? med []?????? hi   Temperature: []?????? lo []?????? med []?????? hi        [x]?????? Skin assessment post-treatment:  [x]??????intact []??????redness- no adverse reaction       29 min Therapeutic Exercise:  [] See flow sheet :   Rationale: increase ROM and increase strength to improve the patients ability to , pinch    Recheck- ROM, Pinch,     MF Hyperextension  MF abduction/adduction  Medium Theraputty: 10/10    With   [] TE   [] TA   [] neuro   [] other: Patient Education: [x] Review HEP    [] Progressed/Changed HEP based on:   [] positioning   [] body mechanics   [] transfers   [] heat/ice application   [] Splint wear/care   [] Sensory re-education   [] scar management      [] other:            Other Objective/Functional Measures:     Hand ROM-Middle Finger   1/21  Eval 2/12     R/L  Right   MP 0-75/80 0-80   PIP 20-90/110 13-95   DIP 0-60/80 0-70        MEEK 205/270 232   Change  +27      Measurements: Taken with Mesfin Dynamometer, in Lbs   Level 2 1/21  Eval 2/12 Change   Right 28 33 +5   Left 52            Pinch Measurements: Taken with Pinch Gauge, in Lbs   (hand) 1/21  Eval 2/12 Change   3 pt      Right 11 12 +1   Left  10              FOTO   1/21  Eval 2/12   Score 59 49   Change  -10     Distance from [facility] with MF- 1 cm     Edema Circumference    Level 2 1/21  Eval 2/12 Change   Right 6.5 6.5 No Change   Left 5.9            Pain Level (0-10 scale) post treatment: 0.5/10    ASSESSMENT/Changes in Function: ROM, Strength improving    Patient will continue to benefit from skilled OT services to modify and progress therapeutic interventions, address ROM deficits, address strength deficits and instruct in home and community integration to attain remaining goals.      []  See Plan of Care  []  See progress note/recertification  []  See Discharge Summary         Progress towards goals / Updated goals:  1.  Patient will be independent in home exercise program for digit ROM. Met 2/3/20     2.  Patient to be independent in edema management strategies. Status at last visit: Met 2/12/20     3.  Patient will be able to touch palm with middle digit. Progressing, 1cm 2/12/20     Long Term Goals: To be accomplished in 4 weeks:              1.  Patient will increase middle digit MEEK by at least 30 degrees for better use for home care tasks. Status at Eval/Last Progress Note: 205  Status at Last Visit: +27, 2/12/20    2.  Patient will be able to  with at least 40# to assit with opening jars. Status at Eval/Last Progress Note: 28  Status at Last Visit: Progressing, +5#   2/12/20    3.  Patient will report pain 0-2/10 with use for home care tasks  Status last visit;  Met for today 1/24/20, look for consistency 2/10/20     4.  Patient will report improved ability to perform carrying and grasping with right hand as shown by increase in FOTO of at least 10 points  Status at Eval/Last Progress Note: 59  Status at Last Visit: 49, 2/12/20         PLAN  []  Upgrade activities as tolerated     [x]  Continue plan of care  []  Update interventions per flow sheet       []  Discharge due to:_  []  Other:_      HARJEET Muhammad 2/12/2020  8:15 AM    Future Appointments   Date Time Provider Sheela Huddleston   2/12/2020 10:15 AM Isaura STORY [facility]   2/14/2020 10:30 AM Isaura TAN [facility]

## 2020-02-12 NOTE — PROGRESS NOTES
[facility] [address]  [phone] fax    Continued Plan of Care/ Re-certification for Occupational Therapy Services    Patient name: Lj Fernando Start of Care: 20   Referral source: Arpita Starr MD : 1945   Medical/Treatment Diagnosis: Hand pain, right [M79.641]  Payor: Luz Marina Linker / Plan: [place] MEDICARE PART A & B / Product Type: Medicare /  Onset Date:19     Prior Hospitalization: see medical history Provider#: [id number]   Medications: Verified on Patient Summary List    Comorbidities: Right trigger finger release, atrial fib, thyroid, arthritis, endometrial cancer  Prior Level of Function:I self care home care driving, retired from resort, likes reading, knitting                                          Visits from [place] of Care: 8    Missed Visits: 0    The Plan of Care and following information is based on the patient's current status:    Hand ROM-Middle Finger      Eval       R/L  Right   MP 0-75/80 0-80   PIP 20-90/110 13-95   DIP 0-60/80 0-70           MEEK 205/270 232   Change   +27       Measurements: Taken with Mesfin Dynamometer, in Lbs   Level 2   Eval  Change   Right 28 33 +5   Left 52             Pinch Measurements: Taken with Pinch Gauge, in Lbs   (hand)   Ev Change   3 pt         Right 11 12 +1   Left  10                    FOTO      Eval    Score 59 49   Change   -10      Distance from [facility] with MF- 1 cm       Edema Circumference    Level 2   Eval  Change   Right 6.5 6.5 No Change   Left 5.9            1.  Patient will be independent in home exercise program for digit ROM. Status at [facility]: did not have  Current status: Met 2/3/20     2.  Patient to be independent in edema management strategies. Status at [facility]: did not have  Current Status: Met 20     3.  Patient will be able to touch palm with middle digit.   Status at [facility]: Unable  Current status:  Progressing, 1cm 2/12/20     Long Term Goals: To be accomplished in 4 weeks:              1.  Patient will increase middle digit MEEK by at least 30 degrees for better use for home care tasks. Status at Eval/Last Progress Note: 205  Current Status : +27, 2/12/20     2.  Patient will be able to  with at least 40# to assit with opening jars. Status at Eval/Last Progress Note: 28  Current Status : Progressing, +5#   2/12/20     3.  Patient will report pain 0-2/10 with use for home care tasks  Status at eval: 3/10  Current Status; Met for today 1/24/20, look for consistency 2/10/20     4.  Patient will report improved ability to perform carrying and grasping with right hand as shown by increase in FOTO of at least 10 points  Status at Eval/Last Progress Note: 59  Current Status: 49, 2/12/20    Key functional changes: Improved ROM, pain      Problems/ barriers to goal attainment: None     Treatment Plan: Therapeutic exercise, Therapeutic activities, Physical agent/modality, Manual therapy, Patient education and ADLs/IADLs      Patient Goal (s) has been updated and includes:  better     Goals for this certification period to be accomplished in 4 weeks:  STG  3.  Patient will be able to touch palm with middle digit. LTG  1.  Patient will increase middle digit MEEK by at least 30 degrees for better use for home care tasks. 2.  Patient will be able to  with at least 40# to assit with opening jars. 4.  Patient will report improved ability to perform carrying and grasping with right hand as shown by increase in FOTO of at least 10 points    Frequency / Duration: Patient to be seen 2 times per week for 4 weeks:    Assessment / Recommendations:Patient will benefit from continued skilled occupational therapy to increase upper extremity function, improve ROM, reduce edema and pain  and increase functional independence.       Certification Period: 2/13/20-3/14/20    TYREL Mcmahon 2/12/2020 1:05 PM    ________________________________________________________________________  I certify that the above Therapy Services are being furnished while the patient is under my care. I agree with the treatment plan and certify that this therapy is necessary.       Physician's Signature:____________Date:_________TIME:________    ** Signature, Date and Time must be completed for valid certification **      Please sign and return to [facility]  [employer]  [address] [place] [address]            [phone] fax

## 2020-02-14 ENCOUNTER — APPOINTMENT (OUTPATIENT)
Dept: PHYSICAL THERAPY | Age: 75
End: 2020-02-14
Payer: MEDICARE

## 2020-02-17 ENCOUNTER — HOSPITAL ENCOUNTER (OUTPATIENT)
Dept: PHYSICAL THERAPY | Age: 75
Discharge: HOME | End: 2020-02-17
Payer: MEDICARE

## 2020-02-17 PROCEDURE — 97018 PARAFFIN BATH THERAPY: CPT

## 2020-02-17 PROCEDURE — 97035 APP MDLTY 1+ULTRASOUND EA 15: CPT

## 2020-02-17 PROCEDURE — 97110 THERAPEUTIC EXERCISES: CPT

## 2020-02-17 NOTE — PROGRESS NOTES
OT DAILY TREATMENT NOTE 10-18    Patient Name: Dortohy oJel  PSNW:[id number]  : 1945  [x]  Patient  Verified  Payor: [place] MEDICARE / Plan: [place] MEDICARE PART A & B / Product Type: Medicare /    In time:410  Out time:452  Total Treatment Time (min): 42  Visit #: 1 of 8    Medicare/BCBS Only   Total Timed Codes (min):  32 1:1 Treatment Time:  42     Treatment Area: Hand pain, right [M77.181]    SUBJECTIVE  Pain Level (0-10 scale): 0/10  Any medication changes, allergies to medications, adverse drug reactions, diagnosis change, or new procedure performed?: [x] No    [] Yes (see summary sheet for update)  Subjective functional status/changes:   [] No changes reported  It has only hurt when I grabbed something wrong.   The pain doesn't wake me up at night any more    OBJECTIVE  Modality rationale: decrease inflammation and increase tissue extensibility to improve the patients ability to grasp   Min Type Additional Details    [] Estim:  []Unatt       []IFC  []Premod                        []Other:  []w/ice   []w/heat  Position:  Location:    [] Estim: []Att    []TENS instruct  []NMES                    []Other:  []w/US   []w/ice   []w/heat  Position:  Location:    []  Traction: [] Cervical       []Lumbar                       [] Prone          []Supine                       []Intermittent   []Continuous Lbs:  [] before manual  [] after manual   8 [x]  Ultrasound: []Continuous   [x] Pulsed                           []1MHz   [x]3MHz W/cm2:1.0  Location: Palm     []  Iontophoresis with dexamethasone         Location: [] Take home patch   [] In clinic   10 []  Ice     []  heat  []  Ice massage  []  Laser   [x]  Paraffin Position:  Location: Right Hand    []  Laser with stim  []  Other:  Position:  Location:    []  Vasopneumatic Device Pressure:       [] lo [] med [] hi   Temperature: [] lo [] med [] hi     [x] Skin assessment post-treatment:  []intact []redness- no adverse reaction  []redness  adverse reaction:     24 min Therapeutic Exercise:  [] See flow sheet :   Rationale: increase ROM and increase strength to improve the patients ability to grasp    MF PIP Extension- PROM  MF Hyperextension, Abduction/adduction   Medium Theraputty: 10/10  Hook Fist 5x       With   [] TE   [] TA   [] neuro   [] other: Patient Education: [x] Review HEP    [] Progressed/Changed HEP based on:   [] positioning   [] body mechanics   [] transfers   [] heat/ice application   [] Splint wear/care   [] Sensory re-education   [] scar management      [] other:            Other Objective/Functional Measures:     Paraffin used to assist with improving tissue extensibility      Pain Level (0-10 scale) post treatment: 0/10    ASSESSMENT/Changes in Function: ROM improving     Patient will continue to benefit from skilled OT services to modify and progress therapeutic interventions, address ROM deficits, address strength deficits and instruct in home and community integration to attain remaining goals. []  See Plan of Care  []  See progress note/recertification  []  See Discharge Summary         Progress towards goals / Updated goals:  3.  Patient will be able to touch palm with middle digit. Progressing with in clinic tasks/HEP 2/17/20  LTG  1.  Patient will increase middle digit MEEK by at least 30 degrees for better use for home care tasks. Progressing with in clinic tasks/HEP 2/17/20  2.  Patient will be able to  with at least 40# to assit with opening jars.   4.  Patient will report improved ability to perform carrying and grasping with right hand as shown by increase in FOTO of at least 10 points    PLAN  []  Upgrade activities as tolerated     [x]  Continue plan of care  []  Update interventions per flow sheet       []  Discharge due to:_  []  Other:_      HARJEET Gil 2/17/2020  9:21 AM    Future Appointments   Date Time Provider Sheela Huddleston   2/17/2020  4:15 PM Varinder Carrasquillo [facility] [address]   2/20/2020  3:00 PM Elba Snow OTR/L [facility]   2/24/2020  9:30 AM Mary GORE [facility]   2/28/2020 10:30 AM Mary BORREROZIRJ [facility]   3/2/2020  9:30 AM Akilah Sharma OTR/L [facility]   3/4/2020 10:15 AM Mary Rae [facility]

## 2020-02-20 ENCOUNTER — HOSPITAL ENCOUNTER (OUTPATIENT)
Dept: PHYSICAL THERAPY | Age: 75
Discharge: HOME | End: 2020-02-20
Payer: MEDICARE

## 2020-02-20 PROCEDURE — 97140 MANUAL THERAPY 1/> REGIONS: CPT

## 2020-02-20 PROCEDURE — 97110 THERAPEUTIC EXERCISES: CPT

## 2020-02-20 PROCEDURE — 97035 APP MDLTY 1+ULTRASOUND EA 15: CPT

## 2020-02-20 NOTE — PROGRESS NOTES
OT DAILY TREATMENT NOTE 10-18    Patient Name: Shania Benavides  BSGF:  : 1945  [x]  Patient  Verified  Payor: Shannan Ellison / Plan: [place] MEDICARE PART A & B / Product Type: Medicare /    In time:300  Out time:345  Total Treatment Time (min): 45  Visit #: 2 of 8    Medicare/BCBS Only   Total Timed Codes (min):  35 1:1 Treatment Time:  45     Treatment Area: Hand pain, right [M79.851]    SUBJECTIVE  Pain Level (0-10 scale): 0/10  Any medication changes, allergies to medications, adverse drug reactions, diagnosis change, or new procedure performed?: [x] No    [] Yes (see summary sheet for update)  Subjective functional status/changes:   [] No changes reported  Only hurts when I put pressure on side of finger  Does not wake me up at night anymore      OBJECTIVE    Modality rationale: decrease pain and increase tissue extensibility to improve the patients ability to grasp   Min Type Additional Details    [] Estim:  []Unatt       []IFC  []Premod                        []Other:  []w/ice   []w/heat  Position:  Location:    [] Estim: []Att    []TENS instruct  []NMES                    []Other:  []w/US   []w/ice   []w/heat  Position:  Location:    []  Traction: [] Cervical       []Lumbar                       [] Prone          []Supine                       []Intermittent   []Continuous Lbs:  [] before manual  [] after manual   8 [x]  Ultrasound: []Continuous   [x] Pulsed                  50%         []1MHz   [x]3MHz W/cm2:1.0  Location:palm, PIP    []  Iontophoresis with dexamethasone         Location: [] Take home patch   [] In clinic         10 []  Ice     []  heat  []  Ice massage  []  Laser   [x]  Paraffin right hand Position:  Location:right hand    []  Laser with stim  []  Other:  Position:  Location:    []  Vasopneumatic Device Pressure:       [] lo [] med [] hi   Temperature: [] lo [] med [] hi       [x] Skin assessment post-treatment:  [x]intact []redness- no adverse reaction    []redness  adverse reaction:       19 min Therapeutic Exercise:  [] See flow sheet :   Rationale: increase ROM and increase strength to improve the patients ability to move digit  Towel scrunch x 10 right  Right PROM hook fist x 5  Med putty and pegs right hand x 10     8 min Manual Therapy:  IASTM   Rationale: increase ROM to improve grasp    With   [] TE   [] TA   [] neuro   [] other: Patient Education: [x] Review HEP    [] Progressed/Changed HEP based on:   [] positioning   [] body mechanics   [] transfers   [] heat/ice application   [] Splint wear/care   [] Sensory re-education   [] scar management      [] other:            Other Objective/Functional Measures:  No longer reports pain with ROM       Pain Level (0-10 scale) post treatment: 0/10    ASSESSMENT/Changes in Function: Improved ROm, decreased pain    Patient will continue to benefit from skilled OT services to modify and progress therapeutic interventions, address ROM deficits, address strength deficits, analyze and address soft tissue restrictions, analyze and cue movement patterns and instruct in home and community integration to attain remaining goals. []  See Plan of Care  []  See progress note/recertification  []  See Discharge Summary         Progress towards goals / Updated goals:  *.  Patient will be able to touch palm with middle digit. Progressing with in clinic tasks/HEP 2/17/20  LTG  1.  Patient will increase middle digit MEEK by at least 30 degrees for better use for home care tasks. Progressing with in clinic tasks/HEP 2/17/20, improved digit flexion in hook fist 2/20/20  2.  Patient will be able to  with at least 40# to assit with opening jars.   4.  Patient will report improved ability to perform carrying and grasping with right hand as shown by increase in FOTO of at least 10 points**    PLAN  []  Upgrade activities as tolerated     [x]  Continue plan of care  []  Update interventions per flow sheet       []  Discharge due to:_  []  Other:_      Long Whitaker Greyson Sunshine OTR/L 2/20/2020  2:54 PM    Future Appointments   Date Time Provider Sheela Huddleston   2/20/2020  3:00 PM Anna Gannon [facility]   2/24/2020  9:30 AM Silvina Mantle VYAVFBJ [facility]   2/28/2020 10:30 AM Silvina Mantle HKGTFEJ [facility]   3/2/2020  9:30 AM Chapin Sorensen OTR/L [facility]   3/4/2020 10:15 AM Silvina Mantle [facility]

## 2020-02-24 ENCOUNTER — HOSPITAL ENCOUNTER (OUTPATIENT)
Dept: PHYSICAL THERAPY | Age: 75
Discharge: HOME | End: 2020-02-24
Payer: MEDICARE

## 2020-02-24 PROCEDURE — 97110 THERAPEUTIC EXERCISES: CPT

## 2020-02-24 PROCEDURE — 97018 PARAFFIN BATH THERAPY: CPT

## 2020-02-24 PROCEDURE — 97035 APP MDLTY 1+ULTRASOUND EA 15: CPT

## 2020-02-24 NOTE — PROGRESS NOTES
OT DAILY TREATMENT NOTE 10-18    Patient Name: Olga Puga  KPMD:[id number]  : 1945  [x]  Patient  Verified  Payor: [place] MEDICARE / Plan: [place] MEDICARE PART A & B / Product Type: Medicare /    In time:930  Out time:1007  Total Treatment Time (min): 37  Visit #: 3 of 8    Medicare/BCBS Only   Total Timed Codes (min):  27 1:1 Treatment Time:  37     Treatment Area: Hand pain, right [M67.981]    SUBJECTIVE  Pain Level (0-10 scale): 0/10  Any medication changes, allergies to medications, adverse drug reactions, diagnosis change, or new procedure performed?: [x] No    [] Yes (see summary sheet for update)  Subjective functional status/changes:   [] No changes reported  I was trying to be so careful with it this weekend. I burnt it a little when I was cleaning the grill. OBJECTIVE  decrease pain and increase tissue extensibility to improve the patients ability to grasp    Min Type Additional Details     []? Estim:  []? Unatt       []? IFC  []? Premod                        []?Other:  []?w/ice   []?w/heat  Position:  Location:     []? Estim: []? Att    []? TENS instruct  []? NMES                    []?Other:  []?w/US   []?w/ice   []?w/heat  Position:  Location:     []? Traction: []? Cervical       []? Lumbar                       []? Prone          []? Supine                       []?Intermittent   []? Continuous Lbs:  []? before manual  []? after manual   8 [x]? Ultrasound: []? Continuous   [x]? Pulsed                  50%         []? 1MHz   [x]? 3MHz W/cm2:1.0  Location:palm, PIP     []? Iontophoresis with dexamethasone         Location: []? Take home patch   []? In clinic            10 []? Ice     []?  heat  []? Ice massage  []? Laser   [x]? Paraffin right hand Position:  Location:right hand     []? Laser with stim  []? Other:  Position:  Location:     []? Vasopneumatic Device Pressure:       []? lo []? med []? hi   Temperature: []? lo []? med []? hi      [x]? Skin assessment post-treatment:  [x]? intact []?redness- no adverse reaction  []? redness  adverse reaction:     19 min Therapeutic Exercise:  [] See flow sheet :   Rationale: increase ROM and increase strength to improve the patients ability to , grasp     Med Theraputty: MF to pull theraputty to increase extension   MF Abduction/Adduction  MF Hyperextension   Added: Lonita Cutting: 3 ways: 10x each for increased  strength     With   [] TE   [] TA   [] neuro   [] other: Patient Education: [x] Review HEP    [] Progressed/Changed HEP based on:   [] positioning   [] body mechanics   [] transfers   [] heat/ice application   [] Splint wear/care   [] Sensory re-education   [] scar management      [] other:            Other Objective/Functional Measures:     Some pulling reported with abduction/adduction   Some irritation at incision site with use of therabar      Pain Level (0-10 scale) post treatment: 0/10    ASSESSMENT/Changes in Function: ROM improving     Patient will continue to benefit from skilled OT services to modify and progress therapeutic interventions, address ROM deficits, address strength deficits and instruct in home and community integration to attain remaining goals. []  See Plan of Care  []  See progress note/recertification  []  See Discharge Summary         Progress towards goals / Updated goals:   STG: Patient will be able to touch palm with middle digit. Progressing with in clinic tasks/HEP 2/24/20  LTG  1.  Patient will increase middle digit MEEK by at least 30 degrees for better use for home care tasks. Progressing with in clinic tasks/HEP 2/17/20, improved digit flexion in hook fist 2/20/20  2.  Patient will be able to  with at least 40# to assit with opening jars.  Progressing with in clinic tasks 2/24/20  4.  Patient will report improved ability to perform carrying and grasping with right hand as shown by increase in FOTO of at least 10 points    PLAN  []  Upgrade activities as tolerated     [x]  Continue plan of care  [] Update interventions per flow sheet       []  Discharge due to:_  []  Other:_      HARJEET Bowman 2/24/2020  8:41 AM    Future Appointments   Date Time Provider Sheela Huddleston   2/24/2020  9:30 AM Jose Maria Niño EWINXLS [facility]   2/28/2020 10:30 AM Jose Maria Niño CQLJTVZ [facility]   3/2/2020  9:30 AM TYREL Fong [facility]   3/4/2020 10:15 AM Jose Maria Niño [facility]

## 2020-02-28 ENCOUNTER — HOSPITAL ENCOUNTER (OUTPATIENT)
Dept: PHYSICAL THERAPY | Age: 75
Discharge: HOME | End: 2020-02-28
Payer: MEDICARE

## 2020-02-28 PROCEDURE — 97018 PARAFFIN BATH THERAPY: CPT

## 2020-02-28 PROCEDURE — 97035 APP MDLTY 1+ULTRASOUND EA 15: CPT

## 2020-02-28 PROCEDURE — 97110 THERAPEUTIC EXERCISES: CPT

## 2020-02-28 NOTE — PROGRESS NOTES
OT DAILY TREATMENT NOTE 10-18    Patient Name: Ricki Ware  PGTJ:  : 1945  [x]  Patient  Verified  Payor: [place] MEDICARE / Plan: VA MEDICARE PART A & B / Product Type: Medicare /    In time:1030  Out time:1115  Total Treatment Time (min): 45  Visit #: 4 of 8    Medicare/BCBS Only   Total Timed Codes (min):  35 1:1 Treatment Time:  45     Treatment Area: Hand pain, right [M87.532]    SUBJECTIVE  Pain Level (0-10 scale): 0/10  Any medication changes, allergies to medications, adverse drug reactions, diagnosis change, or new procedure performed?: [x] No    [] Yes (see summary sheet for update)  Subjective functional status/changes:   [] No changes reported  My hand is doing great. Patient reports MD said she only needed to follow up if needed. OBJECTIVE    decrease pain and increase tissue extensibility to improve the patients ability to grasp     Min Type Additional Details     []? ? Estim:  []?? Unatt       []? ?IFC  []? ?Premod                        []? ? Other:  []??w/ice   []? ?w/heat  Position:  Location:     []? ? Estim: []??Att    []? ?TENS instruct  []? ?NMES                    []? ? Other:  []??w/US   []? ?w/ice   []? ?w/heat  Position:  Location:     []? ?  Traction: []?? Cervical       []? ?Lumbar                       []? ? Prone          []? ?Supine                       []? ?Intermittent   []? ? Continuous Lbs:  []?? before manual  []? ? after manual   8 [x]? ?  Ultrasound: []??Continuous   [x]? ? Pulsed                  50%         []? ?1MHz   [x]? ? 3MHz W/cm2:1.0  Location:palm, PIP     []? ?  Iontophoresis with dexamethasone         Location: []?? Take home patch   []? ? In clinic            10 []??  Ice     []? ?  heat  []? ?  Ice massage  []? ?  Laser   [x]? ?  Paraffin right hand Position:  Location:right hand     []? ?  Laser with stim  []? ?  Other:  Position:  Location:     []? ?  Vasopneumatic Device Pressure:       []? ? lo []? ? med []?? hi   Temperature: []?? lo []? ? med []?? hi      [x]? ? Skin assessment post-treatment:  [x]? ? intact []? ?redness- no adverse reaction  []? ?redness  adverse reaction:     27 min Therapeutic Exercise:  [] See flow sheet :   Rationale: increase ROM and increase strength to improve the patients ability to , pinch    Med Putty: 10/10  Green Therabar: 3 ways: 15x   Abduction/Adduction  MF hyperextension       With   [] TE   [] TA   [] neuro   [] other: Patient Education: [x] Review HEP    [] Progressed/Changed HEP based on:   [] positioning   [] body mechanics   [] transfers   [] heat/ice application   [] Splint wear/care   [] Sensory re-education   [] scar management      [] other:            Other Objective/Functional Measures:     No tightness with putty task today     Pain Level (0-10 scale) post treatment: 0/10    ASSESSMENT/Changes in Function: Progressing towards goals, recheck in 2 visits for DC per patient request     Patient will continue to benefit from skilled OT services to modify and progress therapeutic interventions, address ROM deficits, address strength deficits and instruct in home and community integration to attain remaining goals. []  See Plan of Care  []  See progress note/recertification  []  See Discharge Summary         Progress towards goals / Updated goals:   STG: Patient will be able to touch palm with middle digit. Progressing with in clinic tasks/HEP 2/24/20  LTG  1.  Patient will increase middle digit MEEK by at least 30 degrees for better use for home care tasks. Progressing with in clinic tasks/HEP 2/17/20, improved digit flexion in hook fist 2/20/20  2.  Patient will be able to  with at least 40# to assit with opening jars.  Progressing with in clinic tasks 2/24/20  4.  Patient will report improved ability to perform carrying and grasping with right hand as shown by increase in FOTO of at least 10 points    PLAN  []  Upgrade activities as tolerated     [x]  Continue plan of care  []  Update interventions per flow sheet       [] Discharge due to:_  []  Other:_      HARJEET Flower 2/28/2020  9:09 AM    Future Appointments   Date Time Provider Sheela Huddleston   2/28/2020 10:30 AM Benedict MIRANDA [facility]   3/2/2020  9:30 AM TYREL Mcelroy [facility]CPTPB [facility]   3/4/2020 10:15 AM Benedict Paredes [facility]

## 2020-03-02 ENCOUNTER — HOSPITAL ENCOUNTER (OUTPATIENT)
Dept: PHYSICAL THERAPY | Age: 75
Discharge: HOME | End: 2020-03-02
Payer: MEDICARE

## 2020-03-02 PROCEDURE — 97140 MANUAL THERAPY 1/> REGIONS: CPT

## 2020-03-02 PROCEDURE — 97110 THERAPEUTIC EXERCISES: CPT

## 2020-03-02 NOTE — PROGRESS NOTES
OT DAILY TREATMENT NOTE 10-18    Patient Name: Austin Hunter  HTKJ:[id number]  : 1945  [x]  Patient  Verified  Payor: [place] MEDICARE / Plan: VA MEDICARE PART A & B / Product Type: Medicare /    In time:930  Out time:1015  Total Treatment Time (min): 45  Visit #: 5 of 8    Medicare/BCBS Only   Total Timed Codes (min):  45 1:1 Treatment Time:  45     Treatment Area: Hand pain, right [M79.641]    SUBJECTIVE  Pain Level (0-10 scale): 0/10  Any medication changes, allergies to medications, adverse drug reactions, diagnosis change, or new procedure performed?: [x] No    [] Yes (see summary sheet for update)  Subjective functional status/changes:   [] No changes reported  It is doing way better  Hurts sometime if I hit it    OBJECTIVE    Modality rationale: decrease pain and increase tissue extensibility to improve the patients ability to grasp   Min Type Additional Details    [] Estim:  []Unatt       []IFC  []Premod                        []Other:  []w/ice   []w/heat  Position:  Location:    [] Estim: []Att    []TENS instruct  []NMES                    []Other:  []w/US   []w/ice   []w/heat  Position:  Location:    []  Traction: [] Cervical       []Lumbar                       [] Prone          []Supine                       []Intermittent   []Continuous Lbs:  [] before manual  [] after manual   8 [x]  Ultrasound: []Continuous   [x] Pulsed           50%                []1MHz   [x]3MHz W/cm2:1.0  Location:left hand    []  Iontophoresis with dexamethasone         Location: [] Take home patch   [] In clinic    []  Ice     []  heat  []  Ice massage  []  Laser   []  Paraffin Position:  Location:    []  Laser with stim  []  Other:  Position:  Location:    []  Vasopneumatic Device Pressure:       [] lo [] med [] hi   Temperature: [] lo [] med [] hi       [x] Skin assessment post-treatment:  [x]intact []redness- no adverse reaction    []redness  adverse reaction:       29 min Therapeutic Exercise:  [] See flow sheet : Rationale: increase ROM and increase strength to improve the patients ability to grasp  Grasp  PROM digit flexion  Lumb posiition x 10  Med putty and pegs x 10, lumb pulls, putty HEP       8 min Manual Therapy:  IASTM   Rationale: increase ROM and increase tissue extensibility to grasp, extend digits  Instrument assited soft tissue mobiliizaiton to palm to reduce irritation and tendon drag    With   [] TE   [] TA   [] neuro   [] other: Patient Education: [x] Review HEP    [] Progressed/Changed HEP based on: putty HEP  [] positioning   [] body mechanics   [] transfers   [] heat/ice application   [] Splint wear/care   [] Sensory re-education   [] scar management      [] other:            Other Objective/Functional Measures:   Able to flex digit to within 1/2 inch of palm     Pain Level (0-10 scale) post treatment: 0/10    ASSESSMENT/Changes in Function: Improving digit flexion and extension    Patient will continue to benefit from skilled OT services to modify and progress therapeutic interventions, address ROM deficits, address strength deficits, analyze and address soft tissue restrictions, analyze and cue movement patterns and instruct in home and community integration to attain remaining goals. []  See Plan of Care  []  See progress note/recertification  []  See Discharge Summary         Progress towards goals / Updated goals:  STG: Patient will be able to touch palm with middle digit. Progressing with in clinic tasks/HEP 2/24/20, within 1/2 inch 3/2/20  LTG  1.  Patient will increase middle digit MEEK by at least 30 degrees for better use for home care tasks. Progressing with in clinic tasks/HEP 2/17/20, improved digit flexion in hook fist 2/20/20  2.  Patient will be able to  with at least 40# to assist with opening jars.    Progressing with in clinic tasks 2/24/20  4.  Patient will report improved ability to perform carrying and grasping with right hand as shown by increase in FOTO of at least 10 points**    PLAN  []  Upgrade activities as tolerated     [x]  Continue plan of care  []  Update interventions per flow sheet       []  Discharge due to:_  []  Other:_      Claudette Pulley, OTR/L 3/2/2020  9:23 AM    Future Appointments   Date Time Provider Sheela Huddleston   3/2/2020  9:30 AM Marivel Brizuela OTR/L [id number]   3/4/2020 10:15 AM Teresa Mahmood [id number] [facility]

## 2020-03-04 ENCOUNTER — HOSPITAL ENCOUNTER (OUTPATIENT)
Dept: PHYSICAL THERAPY | Age: 75
Discharge: HOME | End: 2020-03-04
Payer: MEDICARE

## 2020-03-04 PROCEDURE — 97035 APP MDLTY 1+ULTRASOUND EA 15: CPT

## 2020-03-04 PROCEDURE — 97110 THERAPEUTIC EXERCISES: CPT

## 2020-03-04 NOTE — PROGRESS NOTES
OT DAILY TREATMENT NOTE 10-18    Patient Name: Mariaa Barrios  VLQN:[id number]  : 1945  [x]  Patient  Verified  Payor: [place] MEDICARE / Plan: [place] MEDICARE PART A & B / Product Type: Medicare /    In time:  Out time:1038  Total Treatment Time (min): 38  Visit #: 6 of 8    Medicare/BCBS Only   Total Timed Codes (min):  38 1:1 Treatment Time:  38     Treatment Area: Hand pain, right [M75.681]    SUBJECTIVE  Pain Level (0-10 scale): 0/10  Any medication changes, allergies to medications, adverse drug reactions, diagnosis change, or new procedure performed?: [x] No    [] Yes (see summary sheet for update)  Subjective functional status/changes:   [] No changes reported  I grabbed something really hard and it made my hand hurt the other day. OBJECTIVE  Modality rationale: decrease pain and increase tissue extensibility to improve the patients ability to grasp   Min Type Additional Details      []? Estim:  []? Unatt       []? IFC  []? Premod                        []?Other:  []?w/ice   []?w/heat  Position:  Location:      []? Estim: []? Att    []? TENS instruct  []? NMES                    []?Other:  []?w/US   []?w/ice   []?w/heat  Position:  Location:      []? Traction: []? Cervical       []? Lumbar                       []? Prone          []? Supine                       []?Intermittent   []? Continuous Lbs:  []? before manual  []? after manual    8 [x]? Ultrasound: []? Continuous   [x]? Pulsed           50%                []? 1MHz   [x]? 3MHz W/cm2:1.0  Location:left hand      []? Iontophoresis with dexamethasone         Location: []? Take home patch   []? In clinic      []? Ice     []?  heat  []? Ice massage  []? Laser   []? Paraffin Position:  Location:      []? Laser with stim  []? Other:  Position:  Location:      []? Vasopneumatic Device Pressure:       []? lo []? med []? hi   Temperature: []? lo []? med []? hi       [x]? Skin assessment post-treatment:  [x]? intact []? redness- no adverse reaction []?redness  adverse reaction:     30 min Therapeutic Exercise:  [] See flow sheet :   Rationale: increase ROM and increase strength to improve the patients ability to     MP Hyperextension  MF Abduction/Adduction  MP flexion  Hook Fist  Green therabar: 3 ways: 15x each   Med Theraputty: 10/10        With   [] TE   [] TA   [] neuro   [] other: Patient Education: [x] Review HEP    [] Progressed/Changed HEP based on:   [] positioning   [] body mechanics   [] transfers   [] heat/ice application   [] Splint wear/care   [] Sensory re-education   [] scar management      [] other:            Other Objective/Functional Measures:     Some discomfort with putty task      Pain Level (0-10 scale) post treatment: 0/10    ASSESSMENT/Changes in Function: ROM/Strength improving     Patient will continue to benefit from skilled OT services to modify and progress therapeutic interventions, address ROM deficits, address strength deficits and instruct in home and community integration to attain remaining goals. []  See Plan of Care  []  See progress note/recertification  []  See Discharge Summary         Progress towards goals / Updated goals:  STG: Patient will be able to touch palm with middle digit. Progressing with in clinic tasks/HEP 2/24/20, within 1/2 inch 3/2/20    LTG  1.  Patient will increase middle digit MEEK by at least 30 degrees for better use for home care tasks. Progressing with in clinic tasks/HEP 3/4/20    2.  Patient will be able to  with at least 40# to assist with opening jars.    Progressing with in clinic tasks 3/4/20    4.  Patient will report improved ability to perform carrying and grasping with right hand as shown by increase in FOTO of at least 10 points    PLAN  []  Upgrade activities as tolerated     [x]  Continue plan of care  []  Update interventions per flow sheet       []  Discharge due to:_  []  Other:_      HARJEET Ng 3/4/2020  9:05 AM    Future Appointments   Date Time Provider Sheela Huddleston   3/4/2020 10:15 AM Kannan Verdin [id number] [facility]   3/9/2020  1:45 PM Talya Pillai OT MMCPTPB [facility]   3/12/2020  8:30 AM Kannan Verdin [id number] [facility]

## 2020-03-09 ENCOUNTER — HOSPITAL ENCOUNTER (OUTPATIENT)
Dept: PHYSICAL THERAPY | Age: 75
Discharge: HOME | End: 2020-03-09
Payer: MEDICARE

## 2020-03-09 PROCEDURE — 97035 APP MDLTY 1+ULTRASOUND EA 15: CPT

## 2020-03-09 PROCEDURE — 97140 MANUAL THERAPY 1/> REGIONS: CPT

## 2020-03-09 PROCEDURE — 97110 THERAPEUTIC EXERCISES: CPT

## 2020-03-09 NOTE — PROGRESS NOTES
OT DAILY TREATMENT NOTE 10-18    Patient Name: Kwabena Hernandes  YBCB:[id number]  : 1945  [x]  Patient  Verified  Payor: [place] MEDICARE / Plan: VA MEDICARE PART A & B / Product Type: Medicare /    In time:1:45 PM  Out time:2:33 PM  Total Treatment Time (min): 48  Visit #: 7 of 8    Medicare/BCBS Only   Total Timed Codes (min):  48 1:1 Treatment Time:  48     Treatment Area: Hand pain, right [M79.641]    SUBJECTIVE  Pain Level (0-10 scale): 0/10  Any medication changes, allergies to medications, adverse drug reactions, diagnosis change, or new procedure performed?: [x] No    [] Yes (see summary sheet for update)  Subjective functional status/changes:   [] No changes reported  \"I have come such a long way. \"    OBJECTIVE    Modality rationale: decrease inflammation, decrease pain and increase tissue extensibility to improve the patients ability to make a composite fist with right hand. Min Type Additional Details    [] Estim:  []Unatt       []IFC  []Premod                        []Other:  []w/ice   []w/heat  Position:  Location:    [] Estim: []Att    []TENS instruct  []NMES                    []Other:  []w/US   []w/ice   []w/heat  Position:  Location:    []  Traction: [] Cervical       []Lumbar                       [] Prone          []Supine                       []Intermittent   []Continuous Lbs:  [] before manual  [] after manual   8 [x]  Ultrasound: [x]Continuous   [] Pulsed                           []1MHz   [x]3MHz W/cm2: 1.0  Location: right hand middle digit at palm A1 pulley.      []  Iontophoresis with dexamethasone         Location: [] Take home patch   [] In clinic    []  Ice     []  heat  []  Ice massage  []  Laser   []  Paraffin Position:  Location:    []  Laser with stim  []  Other:  Position:  Location:    []  Vasopneumatic Device Pressure:       [] lo [] med [] hi   Temperature: [] lo [] med [] hi       [x] Skin assessment post-treatment:  [x]intact [x]redness- no adverse reaction    []redness  adverse reaction:     30 min Therapeutic Exercise:  [x] See flow sheet :   Rationale: increase ROM and increase strength to improve the patients ability to perform  and pinch tasks using dominant right hand. 10 min Manual Therapy:  IASTM    Rationale: increase ROM, increase tissue extensibility and decrease edema  to extend right hand digits. With   [] TE   [] TA   [] neuro   [] other: Patient Education: [x] Review HEP    [] Progressed/Changed HEP based on:   [] positioning   [] body mechanics   [] transfers   [] heat/ice application   [] Splint wear/care   [] Sensory re-education   [] scar management      [] other:            Other Objective/Functional Measures:  Measurements: Taken with Mesfin Dynamometer, in Lbs   Level 2 1/21  Eval 2/12 3/9/2020      Right 28 33 37   Left 52             Pinch Measurements: Taken with Pinch Gauge, in Lbs   (hand) 1/21  Eval 2/12 3/9/2020      3 pt         Right 11 12 12   Left  10                      Pain Level (0-10 scale) post treatment: 0/10    ASSESSMENT/Changes in Function: Improved right hand  strength. No difficulty with pinch tasks. Slight discomfort with middle digit passive extension. Patient will continue to benefit from skilled OT services to address ROM deficits and address strength deficits to attain remaining goals. []  See Plan of Care  []  See progress note/recertification  []  See Discharge Summary         Progress towards goals / Updated goals:  STG: Patient will be able to touch palm with middle digit. Progressing with in clinic tasks/HEP 2/24/20, within 1/2 inch 3/2/20     LTG  1.  Patient will increase middle digit MEEK by at least 30 degrees for better use for home care tasks.    Progressing with in clinic tasks/HEP 3/9/20     2.  Patient will be able to  with at least 40# to assist with opening jars.   3/9/20 - 37#, progressing     4.  Patient will report improved ability to perform carrying and grasping with right hand as shown by increase in FOTO of at least 10 points  3/9/20 - improving  strength    PLAN  []  Upgrade activities as tolerated     [x]  Continue plan of care  []  Update interventions per flow sheet       []  Discharge due to:_  []  Other:_      Sweetie Urena OT 3/9/2020  1:43 PM    Future Appointments   Date Time Provider Sheela Huddleston   3/9/2020  1:45 PM Adarsh Ernst OT [id number]   3/12/2020  8:30 AM Jill Cirilo [id number] [facility]

## 2020-03-12 ENCOUNTER — HOSPITAL ENCOUNTER (OUTPATIENT)
Dept: PHYSICAL THERAPY | Age: 75
Discharge: HOME | End: 2020-03-12
Payer: MEDICARE

## 2020-03-12 PROCEDURE — 97124 MASSAGE THERAPY: CPT

## 2020-03-12 PROCEDURE — 97018 PARAFFIN BATH THERAPY: CPT

## 2020-03-12 PROCEDURE — 97110 THERAPEUTIC EXERCISES: CPT

## 2020-03-12 NOTE — PROGRESS NOTES
OT DAILY TREATMENT NOTE 10-18    Patient Name: Mick Weinberg  FYWD:  : 1945  [x]  Patient  Verified  Payor: VA MEDICARE / Plan: VA MEDICARE PART A & B / Product Type: Medicare /    In time: 830  Out time:915  Total Treatment Time (min): 45  Visit #:8 of 8    Medicare/BCBS Only   Total Timed Codes (min):  45 1:1 Treatment Time:  45     Treatment Area: Hand pain, right [M79.331]    SUBJECTIVE  Pain Level (0-10 scale): 0/10  Any medication changes, allergies to medications, adverse drug reactions, diagnosis change, or new procedure performed?: [x] No    [] Yes (see summary sheet for update)  Subjective functional status/changes:   [] No changes reported  \"I only feel it if I  the steering wheel too hard\" \"the weeds didn't hurt my hand, it messed up my knee more than anything\"    OBJECTIVE            Modality rationale: decrease inflammation, decrease pain and increase tissue extensibility to improve the patients ability to make a composite fist with right hand. Min Type Additional Details      []? Estim:  []? Unatt       []? IFC  []? Premod                        []?Other:  []?w/ice   []?w/heat  Position:  Location:      []? Estim: []? Att    []? TENS instruct  []? NMES                    []?Other:  []?w/US   []?w/ice   []?w/heat  Position:  Location:      []? Traction: []? Cervical       []? Lumbar                       []? Prone          []? Supine                       []?Intermittent   []? Continuous Lbs:  []? before manual  []? after manual     []? Ultrasound: []? Continuous   []? Pulsed                           []? 1MHz   [x]? 3MHz W/cm2: 1.0  Location: right hand middle digit at palm A1 pulley.       []? Iontophoresis with dexamethasone         Location: []? Take home patch   []? In clinic      []? Ice     []?  heat  []? Ice massage  []? Laser   [x]? Paraffin- 10 mins  Position:  Location:      []? Laser with stim  []? Other:  Position:  Location:      []?   Vasopneumatic Device Pressure:       []? lo []? med []? hi   Temperature: []? lo []? med []? hi          [x]? Skin assessment post-treatment:  [x]? intact [x]? redness- no adverse reaction    []? redness - adverse reaction:   30 min Therapeutic Exercise:  [] See flow sheet :   Rationale: increase ROM and increase strength to improve the patients ability to perform  and pinch tasks using dominant hand     Tendon glides-x5 reps  Education on HEP/theraputty   Green therabar: 15x each way     8 min Manual Therapy:  IASTM   Rationale: increase ROM, increase tissue extensibility and decrease edema  to extend right digits         With   [] TE   [] TA   [] neuro   [] other: Patient Education: [x] Review HEP    [] Progressed/Changed HEP based on:   [] positioning   [] body mechanics   [] transfers   [] heat/ice application   [] Splint wear/care   [] Sensory re-education   [] scar management      [] other:            Other Objective/Functional Measures:   Improving per pt report for daily use of dominant hand, remains with difficulty making full fist/middle digit      Pain Level (0-10 scale) post treatment: 0/10    ASSESSMENT/Changes in Function:   Slight discomfort remains to middle digit extension   **see previous note for discharge/re-checks     Patient will continue to benefit from skilled OT services to address ROM deficits and address strength deficits to attain remaining goals. []  See Plan of Care  []  See progress note/recertification  []  See Discharge Summary         Progress towards goals / Updated goals:  STG: Patient will be able to touch palm with middle digit. Progressing with in clinic tasks/HEP 2/24/20, within 1/2 inch 3/2/20     LTG  1.  Patient will increase middle digit MEEK by at least 30 degrees for better use for home care tasks.    Progressing with in clinic tasks/HEP 3/9/20     2.  Patient will be able to  with at least 40# to assist with opening jars.   3/9/20 - 37#, progressing     4.  Patient will report improved ability to perform carrying and grasping with right hand as shown by increase in FOTO of at least 10 points  3/9/20 - improving  strength    PLAN  []  Upgrade activities as tolerated     []  Continue plan of care  []  Update interventions per flow sheet       [x]  Discharge due to: reached plateau/finished treatment   []  Other:_      HARJEET Orozco 3/12/2020  8:34 AM    No future appointments.

## 2020-12-23 NOTE — PROGRESS NOTES
[facility] [place] [employer]  [place]  [address] [place] [address]  [phone] fax    Occupational Therapy Discharge Summary    Patient name: Dexter Krabbe Start of Care: 2020   Referral source: Agapito Pereira MD : 1945   Medical/Treatment Diagnosis: Hand pain, right [T03.936]  Payor: Elais Fay / Plan: [place] MEDICARE PART A & B / Product Type: Medicare /  Onset Date:19     Prior Hospitalization: see medical history Provider#: [id number]   Medications: Verified on Patient Summary List    Comorbidities: Right trigger finger release, atrial fib, thyroid, arthritis, endometrial cancer  Prior Level of Function:*I self care home care driving, retired from resort, likes reading, knitting                                  **  Visits from [place] of Care: 16   Missed Visits: 0  Reporting Period : 2020 to 3/12/2020    Summary of Care:Patient was seen for modalities, therapeutic exercises and activities to improve hand function. S/he has HEP. Mesfin Dynamometer, in Lbs   Level 2   Eval 2/12 3/9/2020      Right 28 33 37   Left 52             Pinch Measurements: Taken with Pinch Gauge, in Lbs   (hand)   Eval 2/12 3/9/2020      3 pt         Right 11 12 12   Left  10                    STG: Patient will be able to touch palm with middle digit. Status at last note/certification:unable  Status at discharge: not met, progressing     LTG  1.  Patient will increase middle digit MEEK by at least 30 degrees for better use for home care tasks. Status at last note/certification:digit MEEK 232  Status at discharge: not met, Progressing, increased 27    2.  Patient will be able to  with at least 40# to assist with opening jars.   Status at last note:  33  discharge status- 37#, progressing     4.  Patient will report improved ability to perform carrying and grasping with right hand as shown by increase in FOTO of at least 10 points  Status at last note/certification:FOTO 49  Status at discharge: not met    3/9/20 - improving  strength    ASSESSMENT/Changes in Function: progressing towards goals with improved ROM, strength    ASSESSMENT/RECOMMENDATIONS:  [x]Discontinue therapy: []Patient has reached or is progressing toward set goals      []Patient is non-compliant or has abdicated    []Due to lack of appreciable progress towards set goals    TYREL Deleon 12/23/2020 2:00 PM

## (undated) DEVICE — SUTURE ETHLN SZ 4-0 L18IN NONABSORBABLE BLK L19MM PS-2 3/8 1667H

## (undated) DEVICE — SOL IRRIGATION INJ NACL 0.9% 500ML BTL

## (undated) DEVICE — DRESSING,GAUZE,XEROFORM,CURAD,1"X8",ST: Brand: CURAD

## (undated) DEVICE — INTENDED FOR TISSUE SEPARATION, AND OTHER PROCEDURES THAT REQUIRE A SHARP SURGICAL BLADE TO PUNCTURE OR CUT.: Brand: BARD-PARKER ® CARBON RIB-BACK BLADES

## (undated) DEVICE — STRAP,POSITIONING,KNEE/BODY,FOAM,4X60": Brand: MEDLINE

## (undated) DEVICE — GARMENT COMPR L FOR 23IN CALF FLOTRN

## (undated) DEVICE — STERILE POLYISOPRENE POWDER-FREE SURGICAL GLOVES: Brand: PROTEXIS

## (undated) DEVICE — PACK PROCEDURE SURG EXTREMITY CUST

## (undated) DEVICE — NDL HYPO BVL NSAF 25GX1IN LF --

## (undated) DEVICE — (D)PREP SKN CHLRAPRP APPL 26ML -- CONVERT TO ITEM 371833

## (undated) DEVICE — GOWN,AURORA,NONRNF,XL,30/CS: Brand: MEDLINE

## (undated) DEVICE — GARMENT,MEDLINE,DVT,INT,CALF,MED, GEN2: Brand: MEDLINE

## (undated) DEVICE — STERILE POLYISOPRENE POWDER-FREE SURGICAL GLOVES WITH EMOLLIENT COATING: Brand: PROTEXIS

## (undated) DEVICE — BANDAGE COMPR W2INXL5YD ELAS ECON W/ CLP